# Patient Record
Sex: MALE | Race: WHITE | Employment: FULL TIME | URBAN - NONMETROPOLITAN AREA
[De-identification: names, ages, dates, MRNs, and addresses within clinical notes are randomized per-mention and may not be internally consistent; named-entity substitution may affect disease eponyms.]

---

## 2017-01-18 ENCOUNTER — NURSE ONLY (OUTPATIENT)
Dept: FAMILY MEDICINE CLINIC | Age: 67
End: 2017-01-18

## 2017-01-18 DIAGNOSIS — I26.99 OTHER PULMONARY EMBOLISM WITHOUT ACUTE COR PULMONALE, UNSPECIFIED CHRONICITY (HCC): ICD-10-CM

## 2017-01-18 LAB
INTERNATIONAL NORMALIZATION RATIO, POC: 2.2
PROTHROMBIN TIME, POC: NORMAL

## 2017-01-18 PROCEDURE — 85610 PROTHROMBIN TIME: CPT | Performed by: NURSE PRACTITIONER

## 2017-02-15 ENCOUNTER — NURSE ONLY (OUTPATIENT)
Dept: FAMILY MEDICINE CLINIC | Age: 67
End: 2017-02-15

## 2017-02-15 DIAGNOSIS — I26.99 OTHER PULMONARY EMBOLISM WITHOUT ACUTE COR PULMONALE, UNSPECIFIED CHRONICITY (HCC): ICD-10-CM

## 2017-02-15 LAB
INTERNATIONAL NORMALIZATION RATIO, POC: 2.7
PROTHROMBIN TIME, POC: NORMAL

## 2017-02-15 PROCEDURE — 85610 PROTHROMBIN TIME: CPT | Performed by: NURSE PRACTITIONER

## 2017-02-23 RX ORDER — PANTOPRAZOLE SODIUM 40 MG/1
40 TABLET, DELAYED RELEASE ORAL DAILY
Qty: 30 TABLET | Refills: 5 | Status: SHIPPED | OUTPATIENT
Start: 2017-02-23 | End: 2017-07-06 | Stop reason: SDUPTHER

## 2017-03-15 ENCOUNTER — NURSE ONLY (OUTPATIENT)
Dept: FAMILY MEDICINE CLINIC | Age: 67
End: 2017-03-15

## 2017-03-15 DIAGNOSIS — I26.99 OTHER PULMONARY EMBOLISM WITHOUT ACUTE COR PULMONALE, UNSPECIFIED CHRONICITY (HCC): ICD-10-CM

## 2017-03-15 LAB
INTERNATIONAL NORMALIZATION RATIO, POC: 2.8
PROTHROMBIN TIME, POC: NORMAL

## 2017-03-15 PROCEDURE — 85610 PROTHROMBIN TIME: CPT | Performed by: NURSE PRACTITIONER

## 2017-03-29 ENCOUNTER — OFFICE VISIT (OUTPATIENT)
Dept: FAMILY MEDICINE CLINIC | Age: 67
End: 2017-03-29

## 2017-03-29 VITALS
DIASTOLIC BLOOD PRESSURE: 80 MMHG | HEART RATE: 68 BPM | WEIGHT: 166 LBS | SYSTOLIC BLOOD PRESSURE: 132 MMHG | BODY MASS INDEX: 24.51 KG/M2 | RESPIRATION RATE: 15 BRPM

## 2017-03-29 DIAGNOSIS — E78.2 MIXED HYPERLIPIDEMIA: ICD-10-CM

## 2017-03-29 DIAGNOSIS — Z72.0 TOBACCO ABUSE: ICD-10-CM

## 2017-03-29 DIAGNOSIS — I27.82 OTHER CHRONIC PULMONARY EMBOLISM WITHOUT ACUTE COR PULMONALE (HCC): ICD-10-CM

## 2017-03-29 DIAGNOSIS — Z91.81 AT HIGH RISK FOR FALLS: Primary | ICD-10-CM

## 2017-03-29 DIAGNOSIS — K21.9 GASTROESOPHAGEAL REFLUX DISEASE WITHOUT ESOPHAGITIS: ICD-10-CM

## 2017-03-29 DIAGNOSIS — I10 ESSENTIAL HYPERTENSION: ICD-10-CM

## 2017-03-29 PROCEDURE — 99214 OFFICE O/P EST MOD 30 MIN: CPT | Performed by: NURSE PRACTITIONER

## 2017-03-29 ASSESSMENT — ENCOUNTER SYMPTOMS
VOMITING: 0
CONSTIPATION: 0
NAUSEA: 0
CHEST TIGHTNESS: 0
COUGH: 0
DIARRHEA: 0
SHORTNESS OF BREATH: 0
WHEEZING: 0
ABDOMINAL PAIN: 0

## 2017-03-29 ASSESSMENT — PATIENT HEALTH QUESTIONNAIRE - PHQ9
2. FEELING DOWN, DEPRESSED OR HOPELESS: 0
SUM OF ALL RESPONSES TO PHQ QUESTIONS 1-9: 0
1. LITTLE INTEREST OR PLEASURE IN DOING THINGS: 0
SUM OF ALL RESPONSES TO PHQ9 QUESTIONS 1 & 2: 0

## 2017-04-17 ENCOUNTER — NURSE ONLY (OUTPATIENT)
Dept: FAMILY MEDICINE CLINIC | Age: 67
End: 2017-04-17

## 2017-04-17 DIAGNOSIS — I27.82 OTHER CHRONIC PULMONARY EMBOLISM WITHOUT ACUTE COR PULMONALE (HCC): Primary | ICD-10-CM

## 2017-04-17 LAB
INTERNATIONAL NORMALIZATION RATIO, POC: 2.1
PROTHROMBIN TIME, POC: NORMAL

## 2017-04-17 PROCEDURE — 85610 PROTHROMBIN TIME: CPT | Performed by: NURSE PRACTITIONER

## 2017-04-19 RX ORDER — WARFARIN SODIUM 5 MG/1
TABLET ORAL
Qty: 30 TABLET | Refills: 5 | Status: SHIPPED | OUTPATIENT
Start: 2017-04-19 | End: 2017-06-02 | Stop reason: ALTCHOICE

## 2017-05-08 ENCOUNTER — HOSPITAL ENCOUNTER (OUTPATIENT)
Dept: CT IMAGING | Age: 67
Discharge: HOME | End: 2017-05-08
Attending: INTERNAL MEDICINE | Admitting: INTERNAL MEDICINE

## 2017-05-08 DIAGNOSIS — R91.1 SOLITARY PULMONARY NODULE: ICD-10-CM

## 2017-05-08 DIAGNOSIS — R91.1 LUNG NODULE: ICD-10-CM

## 2017-05-08 DIAGNOSIS — I26.99 OTHER PULMONARY EMBOLISM WITHOUT ACUTE COR PULMONALE, UNSPECIFIED CHRONICITY (HCC): ICD-10-CM

## 2017-05-08 LAB
CREAT SERPL-MCNC: 0.8 MG/DL (ref 0.9–1.3)
GFR AFRICAN AMERICAN: >60 ML/MIN/1.73M2
GFR NON-AFRICAN AMERICAN: >60 ML/MIN/1.73M2

## 2017-06-02 ENCOUNTER — OFFICE VISIT (OUTPATIENT)
Dept: FAMILY MEDICINE CLINIC | Age: 67
End: 2017-06-02

## 2017-06-02 ENCOUNTER — HOSPITAL ENCOUNTER (OUTPATIENT)
Dept: GENERAL RADIOLOGY | Age: 67
Discharge: HOME | End: 2017-06-02
Attending: FAMILY MEDICINE | Admitting: FAMILY MEDICINE

## 2017-06-02 VITALS
HEART RATE: 74 BPM | SYSTOLIC BLOOD PRESSURE: 122 MMHG | OXYGEN SATURATION: 96 % | DIASTOLIC BLOOD PRESSURE: 82 MMHG | RESPIRATION RATE: 18 BRPM | WEIGHT: 160 LBS | HEIGHT: 70 IN | BODY MASS INDEX: 22.9 KG/M2

## 2017-06-02 DIAGNOSIS — Z86.711 HISTORY OF PULMONARY EMBOLISM: Primary | ICD-10-CM

## 2017-06-02 DIAGNOSIS — Z86.711 HISTORY OF PULMONARY EMBOLISM: ICD-10-CM

## 2017-06-02 DIAGNOSIS — R05.9 COUGH: ICD-10-CM

## 2017-06-02 DIAGNOSIS — J18.9 PNEUMONIA OF LEFT LOWER LOBE DUE TO INFECTIOUS ORGANISM: ICD-10-CM

## 2017-06-02 DIAGNOSIS — G47.00 INSOMNIA, UNSPECIFIED TYPE: ICD-10-CM

## 2017-06-02 LAB
A/G RATIO: 1.4 (ref 1.1–2.2)
ALBUMIN SERPL-MCNC: 4.2 G/DL (ref 3.4–5)
ALP BLD-CCNC: 89 U/L (ref 40–129)
ALT SERPL-CCNC: 25 U/L (ref 10–40)
ANION GAP SERPL CALCULATED.3IONS-SCNC: 17 MMOL/L (ref 3–16)
AST SERPL-CCNC: 23 U/L (ref 15–37)
BASOPHILS ABSOLUTE: 0.2 K/UL (ref 0–0.2)
BASOPHILS RELATIVE PERCENT: 1.3 %
BILIRUB SERPL-MCNC: 1 MG/DL (ref 0–1)
BUN BLDV-MCNC: 10 MG/DL (ref 7–20)
C-REACTIVE PROTEIN: 147.9 MG/L (ref 0–5.1)
CALCIUM SERPL-MCNC: 9.1 MG/DL (ref 8.3–10.6)
CHLORIDE BLD-SCNC: 101 MMOL/L (ref 99–110)
CO2: 25 MMOL/L (ref 21–32)
CREAT SERPL-MCNC: 0.7 MG/DL (ref 0.8–1.3)
D DIMER: 420 NG/ML DDU (ref 0–229)
EOSINOPHILS ABSOLUTE: 0.1 K/UL (ref 0–0.6)
EOSINOPHILS RELATIVE PERCENT: 0.6 %
GFR AFRICAN AMERICAN: >60
GFR NON-AFRICAN AMERICAN: >60
GLOBULIN: 3.1 G/DL
GLUCOSE BLD-MCNC: 104 MG/DL (ref 70–99)
HCT VFR BLD CALC: 43.1 % (ref 40.5–52.5)
HEMOGLOBIN: 14.1 G/DL (ref 13.5–17.5)
LYMPHOCYTES ABSOLUTE: 1.2 K/UL (ref 1–5.1)
LYMPHOCYTES RELATIVE PERCENT: 8.4 %
MCH RBC QN AUTO: 30.6 PG (ref 26–34)
MCHC RBC AUTO-ENTMCNC: 32.6 G/DL (ref 31–36)
MCV RBC AUTO: 93.7 FL (ref 80–100)
MONOCYTES ABSOLUTE: 1.3 K/UL (ref 0–1.3)
MONOCYTES RELATIVE PERCENT: 9 %
NEUTROPHILS ABSOLUTE: 11.2 K/UL (ref 1.7–7.7)
NEUTROPHILS RELATIVE PERCENT: 80.7 %
PDW BLD-RTO: 14.6 % (ref 12.4–15.4)
PLATELET # BLD: 211 K/UL (ref 135–450)
PMV BLD AUTO: 9.8 FL (ref 5–10.5)
POTASSIUM SERPL-SCNC: 4.3 MMOL/L (ref 3.5–5.1)
RBC # BLD: 4.6 M/UL (ref 4.2–5.9)
SODIUM BLD-SCNC: 143 MMOL/L (ref 136–145)
TOTAL PROTEIN: 7.3 G/DL (ref 6.4–8.2)
WBC # BLD: 13.9 K/UL (ref 4–11)

## 2017-06-02 PROCEDURE — 99214 OFFICE O/P EST MOD 30 MIN: CPT | Performed by: FAMILY MEDICINE

## 2017-06-02 PROCEDURE — 96372 THER/PROPH/DIAG INJ SC/IM: CPT | Performed by: FAMILY MEDICINE

## 2017-06-02 RX ORDER — BETAMETHASONE SODIUM PHOSPHATE AND BETAMETHASONE ACETATE 3; 3 MG/ML; MG/ML
12 INJECTION, SUSPENSION INTRA-ARTICULAR; INTRALESIONAL; INTRAMUSCULAR; SOFT TISSUE ONCE
Status: COMPLETED | OUTPATIENT
Start: 2017-06-02 | End: 2017-06-02

## 2017-06-02 RX ORDER — DOXYCYCLINE HYCLATE 100 MG
100 TABLET ORAL 2 TIMES DAILY
Qty: 20 TABLET | Refills: 0 | Status: SHIPPED | OUTPATIENT
Start: 2017-06-02 | End: 2017-06-12

## 2017-06-02 RX ADMIN — BETAMETHASONE SODIUM PHOSPHATE AND BETAMETHASONE ACETATE 12 MG: 3; 3 INJECTION, SUSPENSION INTRA-ARTICULAR; INTRALESIONAL; INTRAMUSCULAR; SOFT TISSUE at 14:35

## 2017-06-02 ASSESSMENT — ENCOUNTER SYMPTOMS
NAUSEA: 0
COUGH: 1
VOMITING: 0
SORE THROAT: 0
RHINORRHEA: 0
SHORTNESS OF BREATH: 0
DIARRHEA: 0
WHEEZING: 0
CHEST TIGHTNESS: 0
BACK PAIN: 0
BLOOD IN STOOL: 0
CONSTIPATION: 0
ABDOMINAL PAIN: 0
SINUS PRESSURE: 1

## 2017-06-02 ASSESSMENT — PATIENT HEALTH QUESTIONNAIRE - PHQ9
SUM OF ALL RESPONSES TO PHQ QUESTIONS 1-9: 0
2. FEELING DOWN, DEPRESSED OR HOPELESS: 0
1. LITTLE INTEREST OR PLEASURE IN DOING THINGS: 0
SUM OF ALL RESPONSES TO PHQ9 QUESTIONS 1 & 2: 0

## 2017-06-08 ENCOUNTER — OFFICE VISIT (OUTPATIENT)
Dept: FAMILY MEDICINE CLINIC | Age: 67
End: 2017-06-08

## 2017-06-08 VITALS
BODY MASS INDEX: 23.53 KG/M2 | HEART RATE: 83 BPM | DIASTOLIC BLOOD PRESSURE: 74 MMHG | OXYGEN SATURATION: 98 % | WEIGHT: 164 LBS | SYSTOLIC BLOOD PRESSURE: 128 MMHG | RESPIRATION RATE: 15 BRPM

## 2017-06-08 DIAGNOSIS — R05.9 COUGH: ICD-10-CM

## 2017-06-08 DIAGNOSIS — I10 ESSENTIAL HYPERTENSION: Primary | ICD-10-CM

## 2017-06-08 DIAGNOSIS — Z86.711 HISTORY OF PULMONARY EMBOLISM: ICD-10-CM

## 2017-06-08 PROCEDURE — 99213 OFFICE O/P EST LOW 20 MIN: CPT | Performed by: NURSE PRACTITIONER

## 2017-06-08 RX ORDER — LISINOPRIL 20 MG/1
20 TABLET ORAL 2 TIMES DAILY
Qty: 60 TABLET | Refills: 11 | Status: SHIPPED | OUTPATIENT
Start: 2017-06-08 | End: 2018-04-05 | Stop reason: SDUPTHER

## 2017-06-08 ASSESSMENT — ENCOUNTER SYMPTOMS
CONSTIPATION: 0
CHEST TIGHTNESS: 0
COUGH: 0
SHORTNESS OF BREATH: 0
VOMITING: 0
NAUSEA: 0
DIARRHEA: 0
WHEEZING: 0
ABDOMINAL PAIN: 0

## 2017-07-06 ENCOUNTER — OFFICE VISIT (OUTPATIENT)
Dept: FAMILY MEDICINE CLINIC | Age: 67
End: 2017-07-06

## 2017-07-06 VITALS
DIASTOLIC BLOOD PRESSURE: 72 MMHG | WEIGHT: 157 LBS | BODY MASS INDEX: 22.48 KG/M2 | RESPIRATION RATE: 18 BRPM | HEART RATE: 90 BPM | HEIGHT: 70 IN | SYSTOLIC BLOOD PRESSURE: 132 MMHG | OXYGEN SATURATION: 93 %

## 2017-07-06 DIAGNOSIS — E78.2 MIXED HYPERLIPIDEMIA: ICD-10-CM

## 2017-07-06 DIAGNOSIS — J18.9 PNEUMONIA DUE TO INFECTIOUS ORGANISM, UNSPECIFIED LATERALITY, UNSPECIFIED PART OF LUNG: Primary | ICD-10-CM

## 2017-07-06 DIAGNOSIS — Z12.11 COLON CANCER SCREENING: ICD-10-CM

## 2017-07-06 DIAGNOSIS — I10 ESSENTIAL HYPERTENSION: ICD-10-CM

## 2017-07-06 PROCEDURE — 99213 OFFICE O/P EST LOW 20 MIN: CPT | Performed by: NURSE PRACTITIONER

## 2017-07-06 RX ORDER — AMLODIPINE BESYLATE 5 MG/1
5 TABLET ORAL DAILY
Qty: 30 TABLET | Refills: 11 | Status: SHIPPED | OUTPATIENT
Start: 2017-07-06 | End: 2018-04-05 | Stop reason: SDUPTHER

## 2017-07-06 RX ORDER — ATORVASTATIN CALCIUM 40 MG/1
40 TABLET, FILM COATED ORAL DAILY
Qty: 30 TABLET | Refills: 11 | Status: SHIPPED | OUTPATIENT
Start: 2017-07-06 | End: 2018-04-05 | Stop reason: SDUPTHER

## 2017-07-06 RX ORDER — METOPROLOL SUCCINATE 50 MG/1
50 TABLET, EXTENDED RELEASE ORAL DAILY
Qty: 30 TABLET | Refills: 11 | Status: SHIPPED | OUTPATIENT
Start: 2017-07-06 | End: 2018-04-05 | Stop reason: SDUPTHER

## 2017-07-06 RX ORDER — PANTOPRAZOLE SODIUM 40 MG/1
40 TABLET, DELAYED RELEASE ORAL DAILY
Qty: 30 TABLET | Refills: 11 | Status: SHIPPED | OUTPATIENT
Start: 2017-07-06 | End: 2018-04-05 | Stop reason: SDUPTHER

## 2017-07-06 ASSESSMENT — ENCOUNTER SYMPTOMS
VOMITING: 0
DIARRHEA: 0
CHEST TIGHTNESS: 0
WHEEZING: 0
SHORTNESS OF BREATH: 0
CONSTIPATION: 0
ABDOMINAL PAIN: 0
NAUSEA: 0
COUGH: 0

## 2017-07-10 ENCOUNTER — HOSPITAL ENCOUNTER (OUTPATIENT)
Dept: GENERAL RADIOLOGY | Age: 67
Discharge: HOME | End: 2017-07-10
Attending: NURSE PRACTITIONER | Admitting: NURSE PRACTITIONER

## 2017-07-10 DIAGNOSIS — J18.9 UNRESOLVED PNEUMONIA: ICD-10-CM

## 2017-07-11 DIAGNOSIS — J18.9 UNRESOLVED PNEUMONIA: Primary | ICD-10-CM

## 2017-08-09 ENCOUNTER — HOSPITAL ENCOUNTER (OUTPATIENT)
Dept: GENERAL RADIOLOGY | Age: 67
Discharge: HOME | End: 2017-08-09
Attending: INTERNAL MEDICINE | Admitting: NURSE PRACTITIONER

## 2017-08-09 DIAGNOSIS — J18.9 UNRESOLVED PNEUMONIA: ICD-10-CM

## 2017-09-05 ENCOUNTER — PAT TELEPHONE (OUTPATIENT)
Dept: SURGERY | Age: 67
End: 2017-09-05

## 2017-09-05 VITALS — WEIGHT: 160 LBS | HEIGHT: 69 IN | BODY MASS INDEX: 23.7 KG/M2

## 2017-09-06 ENCOUNTER — HOSPITAL ENCOUNTER (OUTPATIENT)
Dept: SURGERY | Age: 67
Discharge: HOME | End: 2017-09-06
Attending: INTERNAL MEDICINE | Admitting: INTERNAL MEDICINE

## 2017-09-06 VITALS
RESPIRATION RATE: 16 BRPM | OXYGEN SATURATION: 95 % | TEMPERATURE: 97.2 F | SYSTOLIC BLOOD PRESSURE: 117 MMHG | BODY MASS INDEX: 23.25 KG/M2 | HEIGHT: 69 IN | DIASTOLIC BLOOD PRESSURE: 82 MMHG | WEIGHT: 157 LBS | HEART RATE: 82 BPM

## 2017-09-06 RX ORDER — SODIUM CHLORIDE, SODIUM LACTATE, POTASSIUM CHLORIDE, CALCIUM CHLORIDE 600; 310; 30; 20 MG/100ML; MG/100ML; MG/100ML; MG/100ML
INJECTION, SOLUTION INTRAVENOUS CONTINUOUS
Status: DISCONTINUED | OUTPATIENT
Start: 2017-09-06 | End: 2017-09-07 | Stop reason: HOSPADM

## 2017-09-06 RX ADMIN — SODIUM CHLORIDE, SODIUM LACTATE, POTASSIUM CHLORIDE, CALCIUM CHLORIDE: 600; 310; 30; 20 INJECTION, SOLUTION INTRAVENOUS at 08:13

## 2017-09-06 ASSESSMENT — PAIN SCALES - GENERAL
PAINLEVEL_OUTOF10: 0
PAINLEVEL_OUTOF10: 0

## 2017-09-06 ASSESSMENT — PAIN - FUNCTIONAL ASSESSMENT: PAIN_FUNCTIONAL_ASSESSMENT: 0-10

## 2017-10-05 ENCOUNTER — OFFICE VISIT (OUTPATIENT)
Dept: FAMILY MEDICINE CLINIC | Age: 67
End: 2017-10-05

## 2017-10-05 VITALS
RESPIRATION RATE: 14 BRPM | SYSTOLIC BLOOD PRESSURE: 138 MMHG | DIASTOLIC BLOOD PRESSURE: 84 MMHG | BODY MASS INDEX: 24.1 KG/M2 | WEIGHT: 163.2 LBS | OXYGEN SATURATION: 90 % | HEART RATE: 74 BPM

## 2017-10-05 DIAGNOSIS — Z72.0 TOBACCO ABUSE: ICD-10-CM

## 2017-10-05 DIAGNOSIS — E78.2 MIXED HYPERLIPIDEMIA: ICD-10-CM

## 2017-10-05 DIAGNOSIS — Z13.6 SCREENING FOR AAA (ABDOMINAL AORTIC ANEURYSM): ICD-10-CM

## 2017-10-05 DIAGNOSIS — I10 ESSENTIAL HYPERTENSION: Primary | ICD-10-CM

## 2017-10-05 DIAGNOSIS — K21.9 GASTROESOPHAGEAL REFLUX DISEASE WITHOUT ESOPHAGITIS: ICD-10-CM

## 2017-10-05 PROCEDURE — 99214 OFFICE O/P EST MOD 30 MIN: CPT | Performed by: NURSE PRACTITIONER

## 2017-10-05 ASSESSMENT — ENCOUNTER SYMPTOMS
WHEEZING: 0
CONSTIPATION: 0
SHORTNESS OF BREATH: 0
VOMITING: 0
ABDOMINAL PAIN: 0
CHEST TIGHTNESS: 0
NAUSEA: 0
DIARRHEA: 0
COUGH: 0

## 2017-10-05 NOTE — PROGRESS NOTES
SUBJECTIVE:    Amena Loud  1950  74 y.o.  male      Chief Complaint   Patient presents with    6 Month Follow-Up     HPI    Problem List Items Addressed This Visit     Tobacco abuse     Number of cigarettes daily varies         Essential hypertension - Primary     Stable  Denies chest pain, SOB, peripheral edema         Gastroesophageal reflux disease without esophagitis     Stable with protonix         Mixed hyperlipidemia     The 10-year ASCVD risk score (Helen Gordon et al., 2013) is: 18.4%    Values used to calculate the score:      Age: 74 years      Sex: Male      Is Non- : No      Diabetic: No      Tobacco smoker: Yes      Systolic Blood Pressure: 018 mmHg      Is BP treated: Yes      HDL Cholesterol: 71 mg/dL      Total Cholesterol: 143 mg/dL     Currently on lipitor             Other Visit Diagnoses     Screening for AAA (abdominal aortic aneurysm)        Relevant Orders    US screening for AAA          Review of Systems   Constitutional: Negative for appetite change, chills, fatigue and unexpected weight change. Respiratory: Negative for cough, chest tightness, shortness of breath and wheezing. Cardiovascular: Negative for chest pain, palpitations and leg swelling. Gastrointestinal: Negative for abdominal pain, constipation, diarrhea, nausea and vomiting. Musculoskeletal: Negative for arthralgias. Neurological: Negative for weakness, numbness and headaches. Hematological: Negative for adenopathy. OBJECTIVE:    /84  Pulse 74  Resp 14  Wt 163 lb 3.2 oz (74 kg)  SpO2 90%  BMI 24.1 kg/m2    Physical Exam   Constitutional: He is oriented to person, place, and time. He appears well-developed and well-nourished. HENT:   Head: Normocephalic and atraumatic. Neck: Normal range of motion. Neck supple. No JVD present. Carotid bruit is not present. Cardiovascular: Normal rate, regular rhythm and normal heart sounds.   Exam reveals no gallop and no

## 2017-10-05 NOTE — ASSESSMENT & PLAN NOTE
The 10-year ASCVD risk score Chandra Pham al., 2013) is: 18.4%    Values used to calculate the score:      Age: 74 years      Sex: Male      Is Non- : No      Diabetic: No      Tobacco smoker: Yes      Systolic Blood Pressure: 836 mmHg      Is BP treated: Yes      HDL Cholesterol: 71 mg/dL      Total Cholesterol: 143 mg/dL     Currently on lipitor

## 2017-10-05 NOTE — MR AVS SNAPSHOT
If you have questions, please contact the physician practice where you receive care. Remember, MyChart is NOT to be used for urgent needs. For medical emergencies, dial 911. For questions regarding your [employer] account call [phone]. If you have a clinical question, please call your doctor's office.

## 2017-10-31 ENCOUNTER — HOSPITAL ENCOUNTER (OUTPATIENT)
Dept: ULTRASOUND IMAGING | Age: 67
Discharge: HOME | End: 2017-10-31
Attending: NURSE PRACTITIONER | Admitting: NURSE PRACTITIONER

## 2017-10-31 DIAGNOSIS — Z13.6 SCREENING FOR AAA (ABDOMINAL AORTIC ANEURYSM): ICD-10-CM

## 2018-04-05 ENCOUNTER — OFFICE VISIT (OUTPATIENT)
Dept: FAMILY MEDICINE CLINIC | Age: 68
End: 2018-04-05

## 2018-04-05 VITALS
WEIGHT: 170 LBS | SYSTOLIC BLOOD PRESSURE: 142 MMHG | DIASTOLIC BLOOD PRESSURE: 80 MMHG | RESPIRATION RATE: 16 BRPM | HEIGHT: 70 IN | BODY MASS INDEX: 24.34 KG/M2 | HEART RATE: 81 BPM

## 2018-04-05 DIAGNOSIS — Z00.00 ROUTINE HEALTH MAINTENANCE: ICD-10-CM

## 2018-04-05 DIAGNOSIS — I10 ESSENTIAL HYPERTENSION: Primary | ICD-10-CM

## 2018-04-05 DIAGNOSIS — K21.9 GASTROESOPHAGEAL REFLUX DISEASE WITHOUT ESOPHAGITIS: ICD-10-CM

## 2018-04-05 DIAGNOSIS — E78.2 MIXED HYPERLIPIDEMIA: ICD-10-CM

## 2018-04-05 DIAGNOSIS — Z72.0 TOBACCO ABUSE: ICD-10-CM

## 2018-04-05 DIAGNOSIS — I27.82 OTHER CHRONIC PULMONARY EMBOLISM WITHOUT ACUTE COR PULMONALE (HCC): ICD-10-CM

## 2018-04-05 LAB
A/G RATIO: 1.6 (ref 1.1–2.2)
ALBUMIN SERPL-MCNC: 4.4 G/DL (ref 3.4–5)
ALP BLD-CCNC: 70 U/L (ref 40–129)
ALT SERPL-CCNC: 21 U/L (ref 10–40)
ANION GAP SERPL CALCULATED.3IONS-SCNC: 16 MMOL/L (ref 3–16)
AST SERPL-CCNC: 23 U/L (ref 15–37)
BILIRUB SERPL-MCNC: 0.4 MG/DL (ref 0–1)
BUN BLDV-MCNC: 16 MG/DL (ref 7–20)
CALCIUM SERPL-MCNC: 9 MG/DL (ref 8.3–10.6)
CHLORIDE BLD-SCNC: 103 MMOL/L (ref 99–110)
CO2: 24 MMOL/L (ref 21–32)
CREAT SERPL-MCNC: 0.7 MG/DL (ref 0.8–1.3)
GFR AFRICAN AMERICAN: >60
GFR NON-AFRICAN AMERICAN: >60
GLOBULIN: 2.7 G/DL
GLUCOSE BLD-MCNC: 93 MG/DL (ref 70–99)
HCT VFR BLD CALC: 40.7 % (ref 40.5–52.5)
HEMOGLOBIN: 14.1 G/DL (ref 13.5–17.5)
MCH RBC QN AUTO: 31.9 PG (ref 26–34)
MCHC RBC AUTO-ENTMCNC: 34.6 G/DL (ref 31–36)
MCV RBC AUTO: 92.3 FL (ref 80–100)
PDW BLD-RTO: 13.8 % (ref 12.4–15.4)
PLATELET # BLD: 226 K/UL (ref 135–450)
PMV BLD AUTO: 9.5 FL (ref 5–10.5)
POTASSIUM SERPL-SCNC: 4.4 MMOL/L (ref 3.5–5.1)
RBC # BLD: 4.42 M/UL (ref 4.2–5.9)
SODIUM BLD-SCNC: 143 MMOL/L (ref 136–145)
TOTAL PROTEIN: 7.1 G/DL (ref 6.4–8.2)
WBC # BLD: 7.8 K/UL (ref 4–11)

## 2018-04-05 PROCEDURE — 99214 OFFICE O/P EST MOD 30 MIN: CPT | Performed by: NURSE PRACTITIONER

## 2018-04-05 RX ORDER — AMLODIPINE BESYLATE 10 MG/1
10 TABLET ORAL DAILY
Qty: 30 TABLET | Refills: 1 | Status: SHIPPED | OUTPATIENT
Start: 2018-04-05 | End: 2018-06-02 | Stop reason: SDUPTHER

## 2018-04-05 RX ORDER — PANTOPRAZOLE SODIUM 40 MG/1
40 TABLET, DELAYED RELEASE ORAL DAILY
Qty: 30 TABLET | Refills: 11 | Status: SHIPPED | OUTPATIENT
Start: 2018-04-05 | End: 2019-04-13 | Stop reason: SDUPTHER

## 2018-04-05 RX ORDER — LISINOPRIL 20 MG/1
20 TABLET ORAL 2 TIMES DAILY
Qty: 60 TABLET | Refills: 11 | Status: SHIPPED | OUTPATIENT
Start: 2018-04-05 | End: 2019-04-12 | Stop reason: SDUPTHER

## 2018-04-05 RX ORDER — ATORVASTATIN CALCIUM 80 MG/1
80 TABLET, FILM COATED ORAL DAILY
Qty: 30 TABLET | Refills: 11 | Status: SHIPPED | OUTPATIENT
Start: 2018-04-05 | End: 2019-02-04 | Stop reason: CLARIF

## 2018-04-05 RX ORDER — METOPROLOL SUCCINATE 50 MG/1
50 TABLET, EXTENDED RELEASE ORAL DAILY
Qty: 30 TABLET | Refills: 11 | Status: SHIPPED | OUTPATIENT
Start: 2018-04-05 | End: 2019-04-30 | Stop reason: SDUPTHER

## 2018-04-05 RX ORDER — ATORVASTATIN CALCIUM 40 MG/1
40 TABLET, FILM COATED ORAL DAILY
Qty: 30 TABLET | Refills: 11 | Status: SHIPPED | OUTPATIENT
Start: 2018-04-05 | End: 2018-04-05 | Stop reason: SDUPTHER

## 2018-04-05 ASSESSMENT — ENCOUNTER SYMPTOMS
COUGH: 0
VOMITING: 0
ABDOMINAL PAIN: 0
WHEEZING: 0
CHEST TIGHTNESS: 0
NAUSEA: 0
SHORTNESS OF BREATH: 0
DIARRHEA: 0
CONSTIPATION: 0

## 2018-05-02 ENCOUNTER — OFFICE VISIT (OUTPATIENT)
Dept: FAMILY MEDICINE CLINIC | Age: 68
End: 2018-05-02

## 2018-05-02 VITALS
RESPIRATION RATE: 18 BRPM | WEIGHT: 164.6 LBS | BODY MASS INDEX: 23.96 KG/M2 | SYSTOLIC BLOOD PRESSURE: 118 MMHG | HEART RATE: 71 BPM | DIASTOLIC BLOOD PRESSURE: 78 MMHG

## 2018-05-02 DIAGNOSIS — K21.9 GASTROESOPHAGEAL REFLUX DISEASE WITHOUT ESOPHAGITIS: ICD-10-CM

## 2018-05-02 DIAGNOSIS — I10 ESSENTIAL HYPERTENSION: Primary | ICD-10-CM

## 2018-05-02 DIAGNOSIS — Z72.0 TOBACCO ABUSE: ICD-10-CM

## 2018-05-02 DIAGNOSIS — E78.2 MIXED HYPERLIPIDEMIA: ICD-10-CM

## 2018-05-02 PROCEDURE — 99214 OFFICE O/P EST MOD 30 MIN: CPT | Performed by: NURSE PRACTITIONER

## 2018-05-02 RX ORDER — ATORVASTATIN CALCIUM 80 MG/1
80 TABLET, FILM COATED ORAL DAILY
Qty: 30 TABLET | Refills: 11 | Status: CANCELLED | OUTPATIENT
Start: 2018-05-02

## 2018-05-02 RX ORDER — AMOXICILLIN 500 MG/1
1 CAPSULE ORAL 3 TIMES DAILY
Refills: 0 | COMMUNITY
Start: 2018-04-27 | End: 2018-11-01 | Stop reason: CLARIF

## 2018-05-02 ASSESSMENT — ENCOUNTER SYMPTOMS
SHORTNESS OF BREATH: 0
VOMITING: 0
ABDOMINAL PAIN: 0
NAUSEA: 0
CHEST TIGHTNESS: 0
DIARRHEA: 0
WHEEZING: 0
COUGH: 0
CONSTIPATION: 0

## 2018-09-26 PROBLEM — R05.9 COUGH: Status: RESOLVED | Noted: 2017-06-02 | Resolved: 2018-09-26

## 2018-11-01 ENCOUNTER — OFFICE VISIT (OUTPATIENT)
Dept: FAMILY MEDICINE CLINIC | Age: 68
End: 2018-11-01
Payer: COMMERCIAL

## 2018-11-01 VITALS
HEART RATE: 103 BPM | OXYGEN SATURATION: 98 % | SYSTOLIC BLOOD PRESSURE: 120 MMHG | WEIGHT: 160.8 LBS | DIASTOLIC BLOOD PRESSURE: 78 MMHG | BODY MASS INDEX: 23.41 KG/M2 | RESPIRATION RATE: 16 BRPM

## 2018-11-01 DIAGNOSIS — Z00.00 ROUTINE HEALTH MAINTENANCE: ICD-10-CM

## 2018-11-01 DIAGNOSIS — I27.82 OTHER CHRONIC PULMONARY EMBOLISM WITHOUT ACUTE COR PULMONALE (HCC): ICD-10-CM

## 2018-11-01 DIAGNOSIS — R07.89 BURNING IN THE CHEST: Primary | ICD-10-CM

## 2018-11-01 DIAGNOSIS — K21.9 GASTROESOPHAGEAL REFLUX DISEASE WITHOUT ESOPHAGITIS: ICD-10-CM

## 2018-11-01 DIAGNOSIS — E78.2 MIXED HYPERLIPIDEMIA: ICD-10-CM

## 2018-11-01 DIAGNOSIS — I10 ESSENTIAL HYPERTENSION: ICD-10-CM

## 2018-11-01 DIAGNOSIS — Z72.0 TOBACCO ABUSE: ICD-10-CM

## 2018-11-01 DIAGNOSIS — R53.83 FATIGUE, UNSPECIFIED TYPE: ICD-10-CM

## 2018-11-01 LAB
HCT VFR BLD CALC: 45.8 % (ref 40.5–52.5)
HEMOGLOBIN: 15.4 G/DL (ref 13.5–17.5)
MCH RBC QN AUTO: 31.6 PG (ref 26–34)
MCHC RBC AUTO-ENTMCNC: 33.5 G/DL (ref 31–36)
MCV RBC AUTO: 94.3 FL (ref 80–100)
PDW BLD-RTO: 13.6 % (ref 12.4–15.4)
PLATELET # BLD: 289 K/UL (ref 135–450)
PMV BLD AUTO: 8.9 FL (ref 5–10.5)
RBC # BLD: 4.86 M/UL (ref 4.2–5.9)
WBC # BLD: 12.2 K/UL (ref 4–11)

## 2018-11-01 PROCEDURE — 99214 OFFICE O/P EST MOD 30 MIN: CPT | Performed by: NURSE PRACTITIONER

## 2018-11-01 PROCEDURE — 93000 ELECTROCARDIOGRAM COMPLETE: CPT | Performed by: NURSE PRACTITIONER

## 2018-11-01 ASSESSMENT — PATIENT HEALTH QUESTIONNAIRE - PHQ9
SUM OF ALL RESPONSES TO PHQ QUESTIONS 1-9: 0
SUM OF ALL RESPONSES TO PHQ QUESTIONS 1-9: 0
SUM OF ALL RESPONSES TO PHQ9 QUESTIONS 1 & 2: 0
1. LITTLE INTEREST OR PLEASURE IN DOING THINGS: 0
2. FEELING DOWN, DEPRESSED OR HOPELESS: 0

## 2018-11-01 ASSESSMENT — ENCOUNTER SYMPTOMS
VOMITING: 0
CONSTIPATION: 0
CHEST TIGHTNESS: 0
ABDOMINAL PAIN: 0
SHORTNESS OF BREATH: 0
NAUSEA: 0
DIARRHEA: 0
WHEEZING: 0
COUGH: 0

## 2018-11-01 NOTE — PROGRESS NOTES
health maintenance  - CBC; Future  - Comprehensive Metabolic Panel; Future  - Lipid Panel; Future    8. Fatigue, unspecified type  Most likely due to stress at work and lack of sleep at night due to stress. Follow up earlier if symptoms persist.                Return in about 3 months (around 2/1/2019).       (Please note that portions of this note may have been completed with a voice recognition program. Efforts were made to edit the dictations but occasionally words aremis-transcribed.)

## 2018-11-01 NOTE — ASSESSMENT & PLAN NOTE
Patient states that 2 days ago he drinks coffee in the afternoon which is abnormal for him. He doesn't her at usual time. He woke up in the middle night\" but I was on fire\". He felt burning from his stomach up to his throat. He did not miss his dose of Protonix. He tried Rolaids and Tums without relief. The burning resolved by the afternoon of the following day. He has not had any issues in the last 2 days. Note that leading up to Tuesday he would eat fast due to being busy at work and get a little burning. Denies chest pain, shortness of breath, decreased exercise tolerance.

## 2018-11-01 NOTE — ASSESSMENT & PLAN NOTE
The 10-year ASCVD risk score (No Cuenca, et al., 2013) is: 15.6%    Values used to calculate the score:      Age: 69 years      Sex: Male      Is Non- : No      Diabetic: No      Tobacco smoker: Yes      Systolic Blood Pressure: 454 mmHg      Is BP treated: Yes      HDL Cholesterol: 71 mg/dL      Total Cholesterol: 143 mg/dL

## 2018-11-01 NOTE — ASSESSMENT & PLAN NOTE
Not smoking at work. Smoking approximately 5 cigarettes a day.  Increased from two cigarettes in the last month

## 2018-11-01 NOTE — ASSESSMENT & PLAN NOTE
Stable. Patient denies any exertional chest pain, dyspnea, palpitations, syncope, orthopnea, edema or paroxysmal nocturnal dyspnea.

## 2018-11-02 LAB
A/G RATIO: 1.5 (ref 1.1–2.2)
ALBUMIN SERPL-MCNC: 4.6 G/DL (ref 3.4–5)
ALP BLD-CCNC: 93 U/L (ref 40–129)
ALT SERPL-CCNC: 27 U/L (ref 10–40)
ANION GAP SERPL CALCULATED.3IONS-SCNC: 17 MMOL/L (ref 3–16)
AST SERPL-CCNC: 26 U/L (ref 15–37)
BILIRUB SERPL-MCNC: 0.6 MG/DL (ref 0–1)
BUN BLDV-MCNC: 15 MG/DL (ref 7–20)
CALCIUM SERPL-MCNC: 9.4 MG/DL (ref 8.3–10.6)
CHLORIDE BLD-SCNC: 102 MMOL/L (ref 99–110)
CHOLESTEROL, TOTAL: 140 MG/DL (ref 0–199)
CO2: 23 MMOL/L (ref 21–32)
CREAT SERPL-MCNC: 0.9 MG/DL (ref 0.8–1.3)
GFR AFRICAN AMERICAN: >60
GFR NON-AFRICAN AMERICAN: >60
GLOBULIN: 3.1 G/DL
GLUCOSE BLD-MCNC: 96 MG/DL (ref 70–99)
HDLC SERPL-MCNC: 56 MG/DL (ref 40–60)
LDL CHOLESTEROL CALCULATED: 66 MG/DL
POTASSIUM SERPL-SCNC: 4.5 MMOL/L (ref 3.5–5.1)
SODIUM BLD-SCNC: 142 MMOL/L (ref 136–145)
TOTAL PROTEIN: 7.7 G/DL (ref 6.4–8.2)
TRIGL SERPL-MCNC: 90 MG/DL (ref 0–150)
VLDLC SERPL CALC-MCNC: 18 MG/DL

## 2018-11-09 DIAGNOSIS — D72.829 LEUKOCYTOSIS, UNSPECIFIED TYPE: Primary | ICD-10-CM

## 2019-02-04 ENCOUNTER — OFFICE VISIT (OUTPATIENT)
Dept: FAMILY MEDICINE CLINIC | Age: 69
End: 2019-02-04
Payer: COMMERCIAL

## 2019-02-04 VITALS
SYSTOLIC BLOOD PRESSURE: 108 MMHG | HEART RATE: 87 BPM | RESPIRATION RATE: 16 BRPM | OXYGEN SATURATION: 96 % | BODY MASS INDEX: 23.81 KG/M2 | DIASTOLIC BLOOD PRESSURE: 70 MMHG | WEIGHT: 163.6 LBS

## 2019-02-04 DIAGNOSIS — R53.83 FATIGUE, UNSPECIFIED TYPE: ICD-10-CM

## 2019-02-04 DIAGNOSIS — R09.89 RHONCHI: ICD-10-CM

## 2019-02-04 DIAGNOSIS — Z72.0 TOBACCO ABUSE: ICD-10-CM

## 2019-02-04 DIAGNOSIS — I10 ESSENTIAL HYPERTENSION: ICD-10-CM

## 2019-02-04 DIAGNOSIS — D72.829 LEUKOCYTOSIS, UNSPECIFIED TYPE: Primary | ICD-10-CM

## 2019-02-04 DIAGNOSIS — K21.9 GASTROESOPHAGEAL REFLUX DISEASE WITHOUT ESOPHAGITIS: ICD-10-CM

## 2019-02-04 DIAGNOSIS — I27.82 OTHER CHRONIC PULMONARY EMBOLISM WITHOUT ACUTE COR PULMONALE (HCC): ICD-10-CM

## 2019-02-04 DIAGNOSIS — E78.2 MIXED HYPERLIPIDEMIA: ICD-10-CM

## 2019-02-04 DIAGNOSIS — D72.829 LEUKOCYTOSIS, UNSPECIFIED TYPE: ICD-10-CM

## 2019-02-04 LAB
BASOPHILS ABSOLUTE: 0.1 K/UL (ref 0–0.2)
BASOPHILS RELATIVE PERCENT: 0.9 %
EOSINOPHILS ABSOLUTE: 0.2 K/UL (ref 0–0.6)
EOSINOPHILS RELATIVE PERCENT: 2.4 %
HCT VFR BLD CALC: 43.4 % (ref 40.5–52.5)
HEMOGLOBIN: 14.5 G/DL (ref 13.5–17.5)
LYMPHOCYTES ABSOLUTE: 1.8 K/UL (ref 1–5.1)
LYMPHOCYTES RELATIVE PERCENT: 22.7 %
MCH RBC QN AUTO: 31.3 PG (ref 26–34)
MCHC RBC AUTO-ENTMCNC: 33.3 G/DL (ref 31–36)
MCV RBC AUTO: 94 FL (ref 80–100)
MONOCYTES ABSOLUTE: 0.8 K/UL (ref 0–1.3)
MONOCYTES RELATIVE PERCENT: 9.8 %
NEUTROPHILS ABSOLUTE: 5 K/UL (ref 1.7–7.7)
NEUTROPHILS RELATIVE PERCENT: 64.2 %
PDW BLD-RTO: 14 % (ref 12.4–15.4)
PLATELET # BLD: 256 K/UL (ref 135–450)
PMV BLD AUTO: 9.6 FL (ref 5–10.5)
RBC # BLD: 4.61 M/UL (ref 4.2–5.9)
TSH REFLEX: 1.57 UIU/ML (ref 0.27–4.2)
WBC # BLD: 7.8 K/UL (ref 4–11)

## 2019-02-04 PROCEDURE — 99214 OFFICE O/P EST MOD 30 MIN: CPT | Performed by: NURSE PRACTITIONER

## 2019-02-04 RX ORDER — ATORVASTATIN CALCIUM 80 MG/1
80 TABLET, FILM COATED ORAL DAILY
Qty: 30 TABLET | Refills: 11 | Status: SHIPPED | OUTPATIENT
Start: 2019-02-04 | End: 2020-01-13

## 2019-02-04 RX ORDER — ATORVASTATIN CALCIUM 40 MG/1
40 TABLET, FILM COATED ORAL DAILY
Refills: 11 | COMMUNITY
Start: 2018-12-03 | End: 2019-02-04 | Stop reason: SDUPTHER

## 2019-02-04 ASSESSMENT — ENCOUNTER SYMPTOMS
COUGH: 0
SHORTNESS OF BREATH: 0
WHEEZING: 0
CHEST TIGHTNESS: 0
DIARRHEA: 0
VOMITING: 0
NAUSEA: 0
CONSTIPATION: 0
ABDOMINAL PAIN: 0

## 2019-02-07 ENCOUNTER — HOSPITAL ENCOUNTER (OUTPATIENT)
Dept: GENERAL RADIOLOGY | Age: 69
Discharge: HOME | End: 2019-02-07
Payer: COMMERCIAL

## 2019-02-07 ENCOUNTER — HOSPITAL ENCOUNTER (OUTPATIENT)
Age: 69
Discharge: HOME | End: 2019-02-07
Payer: COMMERCIAL

## 2019-02-07 DIAGNOSIS — R09.89 RHONCHI: ICD-10-CM

## 2019-02-07 PROCEDURE — 71046 X-RAY EXAM CHEST 2 VIEWS: CPT

## 2019-04-12 DIAGNOSIS — I10 ESSENTIAL HYPERTENSION: ICD-10-CM

## 2019-04-12 RX ORDER — LISINOPRIL 20 MG/1
20 TABLET ORAL 2 TIMES DAILY
Qty: 60 TABLET | Refills: 11 | Status: SHIPPED | OUTPATIENT
Start: 2019-04-12 | End: 2020-03-26

## 2019-05-06 ENCOUNTER — OFFICE VISIT (OUTPATIENT)
Dept: FAMILY MEDICINE CLINIC | Age: 69
End: 2019-05-06
Payer: COMMERCIAL

## 2019-05-06 VITALS
OXYGEN SATURATION: 97 % | BODY MASS INDEX: 23.19 KG/M2 | HEIGHT: 70 IN | SYSTOLIC BLOOD PRESSURE: 118 MMHG | RESPIRATION RATE: 20 BRPM | HEART RATE: 77 BPM | WEIGHT: 162 LBS | DIASTOLIC BLOOD PRESSURE: 68 MMHG

## 2019-05-06 DIAGNOSIS — I10 ESSENTIAL HYPERTENSION: ICD-10-CM

## 2019-05-06 DIAGNOSIS — K21.9 GASTROESOPHAGEAL REFLUX DISEASE WITHOUT ESOPHAGITIS: ICD-10-CM

## 2019-05-06 DIAGNOSIS — I27.82 OTHER CHRONIC PULMONARY EMBOLISM WITHOUT ACUTE COR PULMONALE (HCC): ICD-10-CM

## 2019-05-06 DIAGNOSIS — E78.2 MIXED HYPERLIPIDEMIA: ICD-10-CM

## 2019-05-06 DIAGNOSIS — Z72.0 TOBACCO ABUSE: ICD-10-CM

## 2019-05-06 PROCEDURE — 99214 OFFICE O/P EST MOD 30 MIN: CPT | Performed by: NURSE PRACTITIONER

## 2019-05-06 RX ORDER — AMLODIPINE BESYLATE 10 MG/1
10 TABLET ORAL DAILY
Qty: 30 TABLET | Refills: 11 | Status: SHIPPED | OUTPATIENT
Start: 2019-05-06 | End: 2020-04-01 | Stop reason: SDUPTHER

## 2019-05-06 ASSESSMENT — ENCOUNTER SYMPTOMS
DIARRHEA: 0
VOMITING: 0
NAUSEA: 0
ABDOMINAL PAIN: 0
CONSTIPATION: 0
COUGH: 0
SHORTNESS OF BREATH: 0
WHEEZING: 0
CHEST TIGHTNESS: 0

## 2019-05-06 ASSESSMENT — PATIENT HEALTH QUESTIONNAIRE - PHQ9
1. LITTLE INTEREST OR PLEASURE IN DOING THINGS: 0
SUM OF ALL RESPONSES TO PHQ QUESTIONS 1-9: 0
SUM OF ALL RESPONSES TO PHQ9 QUESTIONS 1 & 2: 0
SUM OF ALL RESPONSES TO PHQ QUESTIONS 1-9: 0
2. FEELING DOWN, DEPRESSED OR HOPELESS: 0

## 2019-05-06 NOTE — PROGRESS NOTES
SUBJECTIVE:    Delia Castleman  1950  77 y.o.  male      Chief Complaint   Patient presents with    3 Month Follow-Up     Pt here for 3 month follow up     HPI    No Known Allergies    Current Outpatient Medications   Medication Sig Dispense Refill    metoprolol succinate (TOPROL XL) 50 MG extended release tablet TAKE 1 TABLET BY MOUTH DAILY 30 tablet 11    pantoprazole (PROTONIX) 40 MG tablet TAKE 1 TABLET BY MOUTH DAILY 30 tablet 5    lisinopril (PRINIVIL;ZESTRIL) 20 MG tablet TAKE 1 TABLET BY MOUTH 2 TIMES DAILY 60 tablet 11    atorvastatin (LIPITOR) 80 MG tablet Take 1 tablet by mouth daily 30 tablet 11    amLODIPine (NORVASC) 10 MG tablet TAKE 1 TABLET BY MOUTH DAILY 30 tablet 11    nicotine (NICODERM CQ) 14 MG/24HR Place 1 patch onto the skin every 24 hours      Multiple Vitamin (MULTI VITAMIN DAILY PO) Take by mouth      aspirin 81 MG tablet Take 81 mg by mouth daily. No current facility-administered medications for this visit.            Past Medical History:   Diagnosis Date    Chronic rhinitis     Cough     GERD (gastroesophageal reflux disease)     Hx of blood clots     Hypertension     Hypertrophy of prostate     Pleuritic chest pain     Pulmonary emboli (HCC)     Tachycardia      Past Surgical History:   Procedure Laterality Date    COLONOSCOPY  08/19/2016    10 small colon polyps, int hem    COLONOSCOPY  09/06/2017    hems grade 2    FINGER SURGERY      LITHOTRIPSY      VASECTOMY       Social History     Socioeconomic History    Marital status:      Spouse name: None    Number of children: None    Years of education: None    Highest education level: None   Occupational History    None   Social Needs    Financial resource strain: None    Food insecurity:     Worry: None     Inability: None    Transportation needs:     Medical: None     Non-medical: None   Tobacco Use    Smoking status: Current Some Day Smoker     Packs/day: 0.50     Years: 50.00     Pack cough, chest tightness, shortness of breath and wheezing. Cardiovascular: Negative for chest pain, palpitations and leg swelling. Gastrointestinal: Negative for abdominal pain, constipation, diarrhea, nausea and vomiting. Musculoskeletal: Negative for arthralgias. Neurological: Negative for weakness, numbness and headaches. Hematological: Negative for adenopathy. OBJECTIVE:    /68 (Site: Right Upper Arm, Position: Sitting, Cuff Size: Medium Adult)   Pulse 77   Resp 20   Ht 5' 9.5\" (1.765 m)   Wt 162 lb (73.5 kg)   SpO2 97%   BMI 23.58 kg/m²     Physical Exam   Constitutional: He is oriented to person, place, and time. He appears well-developed and well-nourished. HENT:   Head: Normocephalic and atraumatic. Neck: Normal range of motion. Neck supple. No JVD present. Carotid bruit is not present. Cardiovascular: Normal rate, regular rhythm and normal heart sounds. Exam reveals no gallop and no friction rub. No murmur heard. Pulmonary/Chest: Effort normal and breath sounds normal. No respiratory distress. He has no wheezes. He has no rhonchi. He has no rales. Abdominal: Soft. Musculoskeletal: Normal range of motion. He exhibits no edema. Neurological: He is alert and oriented to person, place, and time. Skin: Skin is warm and dry. Psychiatric: He has a normal mood and affect. His behavior is normal.       ASSESSMENT/PLAN:    1. Tobacco abuse  counseled    2. Essential hypertension  Continue current medication. 3. Gastroesophageal reflux disease without esophagitis  Continue protonix. 4. Other chronic pulmonary embolism without acute cor pulmonale (HCC)  stable    5. Mixed hyperlipidemia  Continue lipitor. The patient is asked to make an attempt to improve diet and exercise patterns to aid in medical management of this problem. Return in about 6 months (around 11/6/2019).       (Please note that portions of this note may have been completed with a voice recognition program. Efforts were made to edit the dictations but occasionally words aremis-transcribed.)

## 2019-05-06 NOTE — ASSESSMENT & PLAN NOTE
Exercising \"at work\". Walking frequently at work. Remains on lipitor. Not making dietary changes.    The 10-year ASCVD risk score (Bam Ibanez, et al., 2013) is: 17.8%    Values used to calculate the score:      Age: 77 years      Sex: Male      Is Non- : No      Diabetic: No      Tobacco smoker: Yes      Systolic Blood Pressure: 073 mmHg      Is BP treated: Yes      HDL Cholesterol: 56 mg/dL      Total Cholesterol: 140 mg/dL

## 2019-09-26 ENCOUNTER — TELEPHONE (OUTPATIENT)
Dept: FAMILY MEDICINE CLINIC | Age: 69
End: 2019-09-26

## 2019-09-30 ENCOUNTER — OFFICE VISIT (OUTPATIENT)
Dept: FAMILY MEDICINE CLINIC | Age: 69
End: 2019-09-30
Payer: COMMERCIAL

## 2019-09-30 VITALS
BODY MASS INDEX: 21.51 KG/M2 | HEIGHT: 70 IN | HEART RATE: 88 BPM | DIASTOLIC BLOOD PRESSURE: 78 MMHG | SYSTOLIC BLOOD PRESSURE: 126 MMHG | RESPIRATION RATE: 16 BRPM | WEIGHT: 150.25 LBS

## 2019-09-30 DIAGNOSIS — I27.82 OTHER CHRONIC PULMONARY EMBOLISM WITHOUT ACUTE COR PULMONALE (HCC): ICD-10-CM

## 2019-09-30 DIAGNOSIS — Z72.0 TOBACCO ABUSE: ICD-10-CM

## 2019-09-30 DIAGNOSIS — K21.9 GASTROESOPHAGEAL REFLUX DISEASE WITHOUT ESOPHAGITIS: ICD-10-CM

## 2019-09-30 DIAGNOSIS — R93.0 ABNORMAL CT OF THE HEAD: ICD-10-CM

## 2019-09-30 DIAGNOSIS — E78.2 MIXED HYPERLIPIDEMIA: ICD-10-CM

## 2019-09-30 DIAGNOSIS — I10 ESSENTIAL HYPERTENSION: Primary | ICD-10-CM

## 2019-09-30 DIAGNOSIS — K08.9 DENTAL DISORDER: ICD-10-CM

## 2019-09-30 DIAGNOSIS — Z00.00 ROUTINE HEALTH MAINTENANCE: ICD-10-CM

## 2019-09-30 LAB
A/G RATIO: 2.1 (ref 1.1–2.2)
ALBUMIN SERPL-MCNC: 5 G/DL (ref 3.4–5)
ALP BLD-CCNC: 87 U/L (ref 40–129)
ALT SERPL-CCNC: 24 U/L (ref 10–40)
ANION GAP SERPL CALCULATED.3IONS-SCNC: 19 MMOL/L (ref 3–16)
AST SERPL-CCNC: 28 U/L (ref 15–37)
BASOPHILS ABSOLUTE: 0.1 K/UL (ref 0–0.2)
BASOPHILS RELATIVE PERCENT: 0.8 %
BILIRUB SERPL-MCNC: 0.5 MG/DL (ref 0–1)
BUN BLDV-MCNC: 13 MG/DL (ref 7–20)
CALCIUM SERPL-MCNC: 9.7 MG/DL (ref 8.3–10.6)
CHLORIDE BLD-SCNC: 100 MMOL/L (ref 99–110)
CHOLESTEROL, TOTAL: 139 MG/DL (ref 0–199)
CO2: 23 MMOL/L (ref 21–32)
CREAT SERPL-MCNC: 0.6 MG/DL (ref 0.8–1.3)
EOSINOPHILS ABSOLUTE: 0.2 K/UL (ref 0–0.6)
EOSINOPHILS RELATIVE PERCENT: 1.8 %
GFR AFRICAN AMERICAN: >60
GFR NON-AFRICAN AMERICAN: >60
GLOBULIN: 2.4 G/DL
GLUCOSE BLD-MCNC: 81 MG/DL (ref 70–99)
HCT VFR BLD CALC: 45.4 % (ref 40.5–52.5)
HDLC SERPL-MCNC: 79 MG/DL (ref 40–60)
HEMOGLOBIN: 15.2 G/DL (ref 13.5–17.5)
LDL CHOLESTEROL CALCULATED: 48 MG/DL
LYMPHOCYTES ABSOLUTE: 1.3 K/UL (ref 1–5.1)
LYMPHOCYTES RELATIVE PERCENT: 13.8 %
MCH RBC QN AUTO: 31.7 PG (ref 26–34)
MCHC RBC AUTO-ENTMCNC: 33.5 G/DL (ref 31–36)
MCV RBC AUTO: 94.6 FL (ref 80–100)
MONOCYTES ABSOLUTE: 0.9 K/UL (ref 0–1.3)
MONOCYTES RELATIVE PERCENT: 9.1 %
NEUTROPHILS ABSOLUTE: 7.2 K/UL (ref 1.7–7.7)
NEUTROPHILS RELATIVE PERCENT: 74.5 %
PDW BLD-RTO: 14.2 % (ref 12.4–15.4)
PLATELET # BLD: 324 K/UL (ref 135–450)
PMV BLD AUTO: 9.5 FL (ref 5–10.5)
POTASSIUM SERPL-SCNC: 4.9 MMOL/L (ref 3.5–5.1)
RBC # BLD: 4.8 M/UL (ref 4.2–5.9)
SODIUM BLD-SCNC: 142 MMOL/L (ref 136–145)
TOTAL PROTEIN: 7.4 G/DL (ref 6.4–8.2)
TRIGL SERPL-MCNC: 60 MG/DL (ref 0–150)
VLDLC SERPL CALC-MCNC: 12 MG/DL
WBC # BLD: 9.6 K/UL (ref 4–11)

## 2019-09-30 PROCEDURE — 99214 OFFICE O/P EST MOD 30 MIN: CPT | Performed by: NURSE PRACTITIONER

## 2019-09-30 PROCEDURE — 36415 COLL VENOUS BLD VENIPUNCTURE: CPT | Performed by: NURSE PRACTITIONER

## 2019-09-30 RX ORDER — PANTOPRAZOLE SODIUM 40 MG/1
40 TABLET, DELAYED RELEASE ORAL DAILY
Qty: 30 TABLET | Refills: 5 | Status: SHIPPED | OUTPATIENT
Start: 2019-09-30 | End: 2020-03-18

## 2019-09-30 ASSESSMENT — ENCOUNTER SYMPTOMS
NAUSEA: 0
COUGH: 0
DIARRHEA: 0
SHORTNESS OF BREATH: 0
VOMITING: 0
WHEEZING: 0
CONSTIPATION: 0
CHEST TIGHTNESS: 0
ABDOMINAL PAIN: 0

## 2019-09-30 NOTE — PROGRESS NOTES
pain     Pulmonary emboli (HCC)     Tachycardia      Past Surgical History:   Procedure Laterality Date    COLONOSCOPY  08/19/2016    10 small colon polyps, int hem    COLONOSCOPY  09/06/2017    hems grade 2    FINGER SURGERY      LITHOTRIPSY      VASECTOMY       Social History     Socioeconomic History    Marital status:      Spouse name: None    Number of children: None    Years of education: None    Highest education level: None   Occupational History    None   Social Needs    Financial resource strain: None    Food insecurity:     Worry: None     Inability: None    Transportation needs:     Medical: None     Non-medical: None   Tobacco Use    Smoking status: Current Some Day Smoker     Packs/day: 0.50     Years: 50.00     Pack years: 25.00     Types: Cigarettes    Smokeless tobacco: Never Used    Tobacco comment: Pt is not smoking at work only in evening. Substance and Sexual Activity    Alcohol use: Yes     Alcohol/week: 0.0 standard drinks     Comment: 4 beers day     Drug use: No    Sexual activity: Never   Lifestyle    Physical activity:     Days per week: None     Minutes per session: None    Stress: None   Relationships    Social connections:     Talks on phone: None     Gets together: None     Attends Druze service: None     Active member of club or organization: None     Attends meetings of clubs or organizations: None     Relationship status: None    Intimate partner violence:     Fear of current or ex partner: None     Emotionally abused: None     Physically abused: None     Forced sexual activity: None   Other Topics Concern    None   Social History Narrative    None         Tobacco abuse  Continues to smoke. Not ready to quit. Smoking has increased over the last week--off work right now and smoking more out of boredom. Pulmonary embolism (HCC)  Chronic scarring on xray. No SOB. No chest pain. Mixed hyperlipidemia  Remains on lipitor.  No formal physical activity. No dietary changes. The 10-year ASCVD risk score (Sea Hopkins, et al., 2013) is: 19.7%    Values used to calculate the score:      Age: 77 years      Sex: Male      Is Non- : No      Diabetic: No      Tobacco smoker: Yes      Systolic Blood Pressure: 487 mmHg      Is BP treated: Yes      HDL Cholesterol: 56 mg/dL      Total Cholesterol: 140 mg/dL      Gastroesophageal reflux disease without esophagitis  Stable with use of protonix    Essential hypertension  Stable. Patient denies any exertional chest pain, dyspnea, palpitations, syncope, orthopnea, edema or paroxysmal nocturnal dyspnea. Review of Systems   Constitutional: Negative for appetite change, chills, fatigue and unexpected weight change. HENT: Positive for dental problem. Respiratory: Negative for cough, chest tightness, shortness of breath and wheezing. Cardiovascular: Negative for chest pain, palpitations and leg swelling. Gastrointestinal: Negative for abdominal pain, constipation, diarrhea, nausea and vomiting. Musculoskeletal: Negative for arthralgias. Neurological: Negative for weakness, numbness and headaches. Hematological: Negative for adenopathy. OBJECTIVE:    /78 (Site: Right Upper Arm, Position: Sitting, Cuff Size: Medium Adult)   Pulse 88   Resp 16   Ht 5' 9.5\" (1.765 m)   Wt 150 lb 4 oz (68.2 kg)   BMI 21.87 kg/m²     Physical Exam   Constitutional: He is oriented to person, place, and time. He appears well-developed and well-nourished. HENT:   Head: Normocephalic and atraumatic. Mouth/Throat: Dental caries present. Neck: Normal range of motion. Neck supple. No JVD present. Carotid bruit is not present. Cardiovascular: Normal rate, regular rhythm and normal heart sounds. Exam reveals no gallop and no friction rub. No murmur heard. Pulmonary/Chest: Effort normal and breath sounds normal. No respiratory distress. He has no wheezes. He has no rhonchi.  He has

## 2019-09-30 NOTE — ASSESSMENT & PLAN NOTE
Continues to smoke. Not ready to quit. Smoking has increased over the last week--off work right now and smoking more out of boredom.

## 2019-10-03 ENCOUNTER — HOSPITAL ENCOUNTER (OUTPATIENT)
Dept: CT IMAGING | Age: 69
Discharge: HOME | End: 2019-10-03
Payer: COMMERCIAL

## 2019-10-03 DIAGNOSIS — K08.9 DENTAL DISORDER: ICD-10-CM

## 2019-10-03 DIAGNOSIS — R93.89 ABNORMAL CT SCAN: ICD-10-CM

## 2019-10-03 DIAGNOSIS — R93.89 ABNORMAL CT SCAN: Primary | ICD-10-CM

## 2019-10-03 DIAGNOSIS — R93.0 ABNORMAL CT OF THE HEAD: ICD-10-CM

## 2019-10-03 PROCEDURE — 70487 CT MAXILLOFACIAL W/DYE: CPT

## 2019-10-03 PROCEDURE — 6360000004 HC RX CONTRAST MEDICATION: Performed by: NURSE PRACTITIONER

## 2019-10-03 RX ADMIN — IOPAMIDOL 75 ML: 755 INJECTION, SOLUTION INTRAVENOUS at 08:10

## 2019-10-07 ENCOUNTER — TELEPHONE (OUTPATIENT)
Dept: FAMILY MEDICINE CLINIC | Age: 69
End: 2019-10-07

## 2019-10-07 NOTE — TELEPHONE ENCOUNTER
I can see preliminary result, but no final result yet. Patient has a periodontist he sees that requested the CT, but was unable to order. Her name is Maryjo Vuong fax the result when final result.

## 2020-03-18 RX ORDER — PANTOPRAZOLE SODIUM 40 MG/1
TABLET, DELAYED RELEASE ORAL
Qty: 30 TABLET | Refills: 5 | Status: SHIPPED | OUTPATIENT
Start: 2020-03-18 | End: 2020-09-08

## 2020-03-26 RX ORDER — LISINOPRIL 20 MG/1
20 TABLET ORAL 2 TIMES DAILY
Qty: 60 TABLET | Refills: 11 | Status: SHIPPED | OUTPATIENT
Start: 2020-03-26 | End: 2021-03-03 | Stop reason: SDUPTHER

## 2020-04-01 ENCOUNTER — OFFICE VISIT (OUTPATIENT)
Dept: FAMILY MEDICINE CLINIC | Age: 70
End: 2020-04-01
Payer: COMMERCIAL

## 2020-04-01 VITALS
DIASTOLIC BLOOD PRESSURE: 76 MMHG | TEMPERATURE: 98.2 F | WEIGHT: 154.6 LBS | SYSTOLIC BLOOD PRESSURE: 118 MMHG | HEART RATE: 87 BPM | OXYGEN SATURATION: 96 % | RESPIRATION RATE: 20 BRPM | BODY MASS INDEX: 22.5 KG/M2

## 2020-04-01 PROCEDURE — 99214 OFFICE O/P EST MOD 30 MIN: CPT | Performed by: NURSE PRACTITIONER

## 2020-04-01 RX ORDER — METOPROLOL SUCCINATE 50 MG/1
50 TABLET, EXTENDED RELEASE ORAL DAILY
Qty: 30 TABLET | Refills: 11 | Status: SHIPPED | OUTPATIENT
Start: 2020-04-01 | End: 2021-03-03 | Stop reason: SDUPTHER

## 2020-04-01 RX ORDER — AMLODIPINE BESYLATE 10 MG/1
10 TABLET ORAL DAILY
Qty: 30 TABLET | Refills: 11 | Status: SHIPPED | OUTPATIENT
Start: 2020-04-01 | End: 2021-03-03 | Stop reason: SDUPTHER

## 2020-04-01 ASSESSMENT — ENCOUNTER SYMPTOMS
CHEST TIGHTNESS: 0
VOMITING: 0
DIARRHEA: 0
SHORTNESS OF BREATH: 0
WHEEZING: 0
CONSTIPATION: 0
COUGH: 0
ABDOMINAL PAIN: 0
NAUSEA: 0

## 2020-04-01 ASSESSMENT — PATIENT HEALTH QUESTIONNAIRE - PHQ9
1. LITTLE INTEREST OR PLEASURE IN DOING THINGS: 0
SUM OF ALL RESPONSES TO PHQ9 QUESTIONS 1 & 2: 0
SUM OF ALL RESPONSES TO PHQ QUESTIONS 1-9: 0
SUM OF ALL RESPONSES TO PHQ QUESTIONS 1-9: 0
2. FEELING DOWN, DEPRESSED OR HOPELESS: 0

## 2020-04-01 NOTE — ASSESSMENT & PLAN NOTE
Currently on lipitor, aspirin  The 10-year ASCVD risk score (Micah Smith, et al., 2013) is: 16.3%    Values used to calculate the score:      Age: 68 years      Sex: Male      Is Non- : No      Diabetic: No      Tobacco smoker: Yes      Systolic Blood Pressure: 098 mmHg      Is BP treated: Yes      HDL Cholesterol: 79 mg/dL      Total Cholesterol: 139 mg/dL

## 2020-05-19 ENCOUNTER — HOSPITAL ENCOUNTER (OUTPATIENT)
Age: 70
Setting detail: SPECIMEN
Discharge: HOME | End: 2020-05-19
Payer: COMMERCIAL

## 2020-05-19 ENCOUNTER — OFFICE VISIT (OUTPATIENT)
Dept: PRIMARY CARE CLINIC | Age: 70
End: 2020-05-19
Payer: COMMERCIAL

## 2020-05-19 VITALS — TEMPERATURE: 97.2 F | OXYGEN SATURATION: 99 % | HEART RATE: 90 BPM

## 2020-05-19 PROCEDURE — 99212 OFFICE O/P EST SF 10 MIN: CPT | Performed by: FAMILY MEDICINE

## 2020-05-19 PROCEDURE — U0002 COVID-19 LAB TEST NON-CDC: HCPCS

## 2020-05-19 NOTE — PROGRESS NOTES
5/19/20  Chioma Pal  1950     Employer: [employer]    FLU/COVID-19 CLINIC EVALUATION    HPI SYMPTOMS:  67 yo M presenting for symptoms below. States that yesterday, he ate spaghetti and felt like he had indigestion/reflux. Woke up with abdominal pain and sweats in the middle of the night and took some of his reflux medication, which helped. States symptoms felt better as long as he stayed upright. Is feeling much better today. Called his PCP but was told they could not see anyone with a fever and that he needed to come here first.  Patient denies any URI symptoms, feeling well currently. [] Fevers  [] Chills  [] Cough  [] Coughing up blood  [] Chest Congestion  [] Nasal Congestion  [] Feeling short of breath  [] Sometimes  [] Frequently  [] All the time  [] Chest pain  [x] Headaches  [x]Tolerable  [] Severe  [] Sore throat  [] Muscle aches  [] Nausea  [] Vomiting  []Unable to keep fluids down  [] Diarrhea  []Severe    [x] OTHER SYMPTOMS: fatigue, sweats, and abdominal pain      Symptom Duration:   [x] 1  [] 2   [] 3   [] 4    [] 5   [] 6   [] 7   [] 8   [] 9   [] 10   [] 11   [] 12   [] 13   [] 14   [] Longer than 14 days    Symptom course:   [] Worsening     [] Stable     [x] Improving    RISK FACTORS:    [] Pregnant or possibly pregnant  [x] Age over 61  [] Diabetes  [] Heart disease  [] Asthma  [] COPD/Other chronic lung diseases  [] Active Cancer  [] On Chemotherapy  [] Taking oral steroids  [] History Lymphoma/Leukemia  [] Close contact with a lab confirmed COVID-19 patient within 14 days of symptom onset  [] History of travel from affected geographical areas within 14 days of symptom onset       VITALS:  Vitals:    05/19/20 1237   Pulse: 90   Temp: 97.2 °F (36.2 °C)   SpO2: 99%      Physical Exam  Vitals signs and nursing note reviewed. Constitutional:       General: He is not in acute distress. Appearance: Normal appearance.  He is not ill-appearing, toxic-appearing or diaphoretic. HENT:      Head: Normocephalic and atraumatic. Nose: Nose normal.      Mouth/Throat:      Pharynx: Oropharynx is clear. No oropharyngeal exudate or posterior oropharyngeal erythema. Eyes:      General:         Right eye: No discharge. Left eye: No discharge. Conjunctiva/sclera: Conjunctivae normal.   Cardiovascular:      Rate and Rhythm: Normal rate and regular rhythm. Heart sounds: Normal heart sounds. Pulmonary:      Effort: Pulmonary effort is normal.      Breath sounds: Normal breath sounds. No wheezing or rales. Skin:     General: Skin is warm and dry. Neurological:      Mental Status: He is alert. TESTS:  COVID pending  POCT FLU:  [] Positive     []Negative    ASSESSMENT:  Symptoms seem consistent with GERD, now mostly improved. Advised following up with PCP if symptoms recur. COVID ordered per request.  [] Flu  [] Possible COVID-19  [] Strep    PLAN:    [x] Discharge home with written instructions for:  [] Flu management  [x] Possible COVID-19 infection with self-quarantine and management of symptoms  [x] Follow-up with primary care physician or emergency department if worsens  [] Evaluation per physician/nurse practitioner in clinic  [] Sent to ER       An  electronic signature was used to authenticate this note.      --Zohreh Ventura MD on 5/19/2020 at 12:37 PM

## 2020-05-21 LAB
SARS-COV-2: NOT DETECTED
SOURCE: NORMAL

## 2020-09-08 RX ORDER — PANTOPRAZOLE SODIUM 40 MG/1
TABLET, DELAYED RELEASE ORAL
Qty: 30 TABLET | Refills: 5 | Status: SHIPPED | OUTPATIENT
Start: 2020-09-08 | End: 2021-02-02 | Stop reason: SDUPTHER

## 2020-09-30 ENCOUNTER — OFFICE VISIT (OUTPATIENT)
Dept: FAMILY MEDICINE CLINIC | Age: 70
End: 2020-09-30
Payer: COMMERCIAL

## 2020-09-30 VITALS
OXYGEN SATURATION: 96 % | TEMPERATURE: 98.2 F | WEIGHT: 153.8 LBS | RESPIRATION RATE: 16 BRPM | BODY MASS INDEX: 22.39 KG/M2 | SYSTOLIC BLOOD PRESSURE: 114 MMHG | HEART RATE: 89 BPM | DIASTOLIC BLOOD PRESSURE: 76 MMHG

## 2020-09-30 DIAGNOSIS — Z72.0 TOBACCO ABUSE: ICD-10-CM

## 2020-09-30 DIAGNOSIS — Z12.5 SCREENING PSA (PROSTATE SPECIFIC ANTIGEN): ICD-10-CM

## 2020-09-30 DIAGNOSIS — I10 ESSENTIAL HYPERTENSION: ICD-10-CM

## 2020-09-30 DIAGNOSIS — E78.2 MIXED HYPERLIPIDEMIA: ICD-10-CM

## 2020-09-30 DIAGNOSIS — R63.4 WEIGHT LOSS: ICD-10-CM

## 2020-09-30 LAB
A/G RATIO: 1.4 (ref 1.1–2.2)
ALBUMIN SERPL-MCNC: 4.1 G/DL (ref 3.4–5)
ALP BLD-CCNC: 79 U/L (ref 40–129)
ALT SERPL-CCNC: 25 U/L (ref 10–40)
ANION GAP SERPL CALCULATED.3IONS-SCNC: 11 MMOL/L (ref 3–16)
AST SERPL-CCNC: 28 U/L (ref 15–37)
BASOPHILS ABSOLUTE: 0.1 K/UL (ref 0–0.2)
BASOPHILS RELATIVE PERCENT: 0.8 %
BILIRUB SERPL-MCNC: 0.3 MG/DL (ref 0–1)
BUN BLDV-MCNC: 14 MG/DL (ref 7–20)
C-REACTIVE PROTEIN: 5.3 MG/L (ref 0–5.1)
CALCIUM SERPL-MCNC: 9.1 MG/DL (ref 8.3–10.6)
CHLORIDE BLD-SCNC: 101 MMOL/L (ref 99–110)
CHOLESTEROL, TOTAL: 123 MG/DL (ref 0–199)
CO2: 26 MMOL/L (ref 21–32)
CREAT SERPL-MCNC: 0.7 MG/DL (ref 0.8–1.3)
EOSINOPHILS ABSOLUTE: 0.2 K/UL (ref 0–0.6)
EOSINOPHILS RELATIVE PERCENT: 3 %
GFR AFRICAN AMERICAN: >60
GFR NON-AFRICAN AMERICAN: >60
GLOBULIN: 3 G/DL
GLUCOSE BLD-MCNC: 89 MG/DL (ref 70–99)
HCT VFR BLD CALC: 42.1 % (ref 40.5–52.5)
HDLC SERPL-MCNC: 64 MG/DL (ref 40–60)
HEMOGLOBIN: 14.4 G/DL (ref 13.5–17.5)
LDL CHOLESTEROL CALCULATED: 49 MG/DL
LYMPHOCYTES ABSOLUTE: 1.6 K/UL (ref 1–5.1)
LYMPHOCYTES RELATIVE PERCENT: 21.9 %
MCH RBC QN AUTO: 31.6 PG (ref 26–34)
MCHC RBC AUTO-ENTMCNC: 34.2 G/DL (ref 31–36)
MCV RBC AUTO: 92.5 FL (ref 80–100)
MONOCYTES ABSOLUTE: 0.8 K/UL (ref 0–1.3)
MONOCYTES RELATIVE PERCENT: 10.9 %
NEUTROPHILS ABSOLUTE: 4.8 K/UL (ref 1.7–7.7)
NEUTROPHILS RELATIVE PERCENT: 63.4 %
PDW BLD-RTO: 14 % (ref 12.4–15.4)
PLATELET # BLD: 265 K/UL (ref 135–450)
PMV BLD AUTO: 9 FL (ref 5–10.5)
POTASSIUM SERPL-SCNC: 4.3 MMOL/L (ref 3.5–5.1)
PROSTATE SPECIFIC ANTIGEN: 0.66 NG/ML (ref 0–4)
RBC # BLD: 4.55 M/UL (ref 4.2–5.9)
SEDIMENTATION RATE, ERYTHROCYTE: 18 MM/HR (ref 0–20)
SODIUM BLD-SCNC: 138 MMOL/L (ref 136–145)
TOTAL PROTEIN: 7.1 G/DL (ref 6.4–8.2)
TRIGL SERPL-MCNC: 52 MG/DL (ref 0–150)
TSH REFLEX: 1.67 UIU/ML (ref 0.27–4.2)
VLDLC SERPL CALC-MCNC: 10 MG/DL
WBC # BLD: 7.5 K/UL (ref 4–11)

## 2020-09-30 PROCEDURE — 99214 OFFICE O/P EST MOD 30 MIN: CPT | Performed by: NURSE PRACTITIONER

## 2020-09-30 ASSESSMENT — ENCOUNTER SYMPTOMS
ABDOMINAL PAIN: 0
DIARRHEA: 0
CHEST TIGHTNESS: 0
CONSTIPATION: 0
WHEEZING: 0
COUGH: 0
BLOOD IN STOOL: 0
SHORTNESS OF BREATH: 0
VOMITING: 0
NAUSEA: 0

## 2020-09-30 ASSESSMENT — PATIENT HEALTH QUESTIONNAIRE - PHQ9
SUM OF ALL RESPONSES TO PHQ QUESTIONS 1-9: 0
SUM OF ALL RESPONSES TO PHQ QUESTIONS 1-9: 0
SUM OF ALL RESPONSES TO PHQ9 QUESTIONS 1 & 2: 0
2. FEELING DOWN, DEPRESSED OR HOPELESS: 0
1. LITTLE INTEREST OR PLEASURE IN DOING THINGS: 0

## 2020-09-30 NOTE — PROGRESS NOTES
SUBJECTIVE:    Wilber Cazares  1950  68 y.o.  male      Chief Complaint   Patient presents with    6 Month Follow-Up     Pt here for 6 month follow up     HPI       Discussed weight loss. Patient not concerned. He did not notice. He has gained some weight back. No abdominal pain, n/v/d. No cough, SOB, chest pain. No change in appetite. He does have a h/o of smoking. He walks three miles a day at work. No dietary changes. No Known Allergies    Current Outpatient Medications   Medication Sig Dispense Refill    pantoprazole (PROTONIX) 40 MG tablet TAKE 1 TABLET BY MOUTH EVERY DAY 30 tablet 5    metoprolol succinate (TOPROL XL) 50 MG extended release tablet TAKE 1 TABLET BY MOUTH DAILY 30 tablet 11    amLODIPine (NORVASC) 10 MG tablet Take 1 tablet by mouth daily 30 tablet 11    lisinopril (PRINIVIL;ZESTRIL) 20 MG tablet TAKE 1 TABLET BY MOUTH 2 TIMES DAILY 60 tablet 11    atorvastatin (LIPITOR) 80 MG tablet TAKE 1 TABLET BY MOUTH EVERY DAY 30 tablet 11    nicotine (NICODERM CQ) 14 MG/24HR Place 1 patch onto the skin every 24 hours      Multiple Vitamin (MULTI VITAMIN DAILY PO) Take by mouth      aspirin 81 MG tablet Take 81 mg by mouth daily. No current facility-administered medications for this visit.            Past Medical History:   Diagnosis Date    Chronic rhinitis     Cough     GERD (gastroesophageal reflux disease)     Hx of blood clots     Hypertension     Hypertrophy of prostate     Pleuritic chest pain     Pulmonary emboli (HCC)     Tachycardia      Past Surgical History:   Procedure Laterality Date    COLONOSCOPY  08/19/2016    10 small colon polyps, int hem    COLONOSCOPY  09/06/2017    hems grade 2    FINGER SURGERY      LITHOTRIPSY      VASECTOMY       Social History     Socioeconomic History    Marital status:      Spouse name: None    Number of children: None    Years of education: None    Highest education level: None   Occupational History    None Social Needs    Financial resource strain: None    Food insecurity     Worry: None     Inability: None    Transportation needs     Medical: None     Non-medical: None   Tobacco Use    Smoking status: Current Some Day Smoker     Packs/day: 0.50     Years: 50.00     Pack years: 25.00     Types: Cigarettes    Smokeless tobacco: Never Used    Tobacco comment: Pt is not smoking at work only in evening. Substance and Sexual Activity    Alcohol use: Yes     Alcohol/week: 0.0 standard drinks     Comment: 4 beers day     Drug use: No    Sexual activity: Never   Lifestyle    Physical activity     Days per week: None     Minutes per session: None    Stress: None   Relationships    Social connections     Talks on phone: None     Gets together: None     Attends Christianity service: None     Active member of club or organization: None     Attends meetings of clubs or organizations: None     Relationship status: None    Intimate partner violence     Fear of current or ex partner: None     Emotionally abused: None     Physically abused: None     Forced sexual activity: None   Other Topics Concern    None   Social History Narrative    None         Pulmonary embolism ([address]  The patient denies cough, chest pain, dyspnea, wheezing or hemoptysis. Mixed hyperlipidemia  Remains on lipitor. Walking three miles a day at work. No dietary changes. Essential hypertension  Stable. Patient denies any exertional chest pain, dyspnea, palpitations, syncope, orthopnea, edema or paroxysmal nocturnal dyspnea. Review of Systems   Constitutional: Negative for appetite change, chills, fatigue, fever and unexpected weight change. Respiratory: Negative for cough, chest tightness, shortness of breath and wheezing. Cardiovascular: Negative for chest pain, palpitations and leg swelling. Gastrointestinal: Negative for abdominal pain, blood in stool, constipation, diarrhea, nausea and vomiting.    Musculoskeletal: Negative for arthralgias. Neurological: Negative for weakness, numbness and headaches. Hematological: Negative for adenopathy. OBJECTIVE:    /76 (Site: Left Upper Arm, Position: Sitting, Cuff Size: Medium Adult)   Pulse 89   Temp 98.2 °F (36.8 °C) (Infrared)   Resp 16   Wt 153 lb 12.8 oz (69.8 kg)   SpO2 96%   BMI 22.39 kg/m²     Physical Exam  Constitutional:       Appearance: Normal appearance. He is well-developed. HENT:      Head: Normocephalic and atraumatic. Right Ear: Hearing normal.      Left Ear: Hearing normal.   Neck:      Musculoskeletal: Normal range of motion and neck supple. Vascular: No carotid bruit or JVD. Cardiovascular:      Rate and Rhythm: Normal rate and regular rhythm. Heart sounds: Normal heart sounds. No murmur. No friction rub. No gallop. Pulmonary:      Effort: Pulmonary effort is normal. No respiratory distress. Breath sounds: Normal breath sounds. No wheezing, rhonchi or rales. Abdominal:      Palpations: Abdomen is soft. Musculoskeletal: Normal range of motion. Skin:     General: Skin is warm and dry. Neurological:      General: No focal deficit present. Mental Status: He is alert and oriented to person, place, and time. Psychiatric:         Mood and Affect: Mood normal.         Behavior: Behavior normal. Behavior is cooperative. Thought Content: Thought content normal.         ASSESSMENT/PLAN:    1. Weight loss  Labs as ordered. Referred for colonoscopy. Return in one month  - [address]; Future  - SEDIMENTATION RATE; Future  - C-REACTIVE PROTEIN; Future  - TSH with Reflex; Future    2. Lung nodule  CT as ordered  - CT CHEST W WO CONTRAST; Future    3. Mixed hyperlipidemia  - Lipid Panel; Future    4. Essential hypertension  - CBC Auto Differential; Future  - Comprehensive Metabolic Panel; Future    5. Tobacco abuse  Counseled on cessation  - CBC Auto Differential; Future    6.  Colon cancer screening  - [facility], [place]    7. Screening PSA (prostate specific antigen)  - Psa screening; Future    8. Other chronic pulmonary embolism without acute cor pulmonale ([facility] [address]           Return in about 4 weeks (around 10/28/2020).       (Please note that portions of this note may have been completed with a voice recognition program. Efforts were made to edit the dictations but occasionally words aremis-transcribed.)

## 2020-10-02 ENCOUNTER — TELEPHONE (OUTPATIENT)
Dept: GASTROENTEROLOGY | Age: 70
End: 2020-10-02

## 2020-10-19 ENCOUNTER — OFFICE VISIT (OUTPATIENT)
Dept: GASTROENTEROLOGY | Age: 70
End: 2020-10-19
Payer: COMMERCIAL

## 2020-10-19 VITALS
HEIGHT: 70 IN | OXYGEN SATURATION: 98 % | TEMPERATURE: 98.2 F | DIASTOLIC BLOOD PRESSURE: 78 MMHG | HEART RATE: 84 BPM | BODY MASS INDEX: 22.15 KG/M2 | SYSTOLIC BLOOD PRESSURE: 108 MMHG | WEIGHT: 154.7 LBS

## 2020-10-19 PROCEDURE — 99202 OFFICE O/P NEW SF 15 MIN: CPT | Performed by: NURSE PRACTITIONER

## 2020-10-19 RX ORDER — POLYETHYLENE GLYCOL 3350 17 G/17G
238 POWDER, FOR SOLUTION ORAL ONCE
Qty: 1 BOTTLE | Refills: 0 | Status: SHIPPED | OUTPATIENT
Start: 2020-10-19 | End: 2020-10-19

## 2020-10-19 RX ORDER — BISACODYL 5 MG
TABLET, DELAYED RELEASE (ENTERIC COATED) ORAL
Qty: 4 TABLET | Refills: 0 | Status: SHIPPED | OUTPATIENT
Start: 2020-10-19 | End: 2020-11-06

## 2020-10-19 ASSESSMENT — ENCOUNTER SYMPTOMS
SHORTNESS OF BREATH: 0
CONSTIPATION: 0
EYE DISCHARGE: 0
WHEEZING: 0
VOMITING: 0
BACK PAIN: 0
ABDOMINAL PAIN: 0
COUGH: 0
DIARRHEA: 0
EYE PAIN: 0
BLOOD IN STOOL: 0
NAUSEA: 0
COLOR CHANGE: 0

## 2020-10-19 NOTE — PROGRESS NOTES
Lynn Mullen 68 y.o. male was seen by BASILIO Palacios on 10/19/2020     Wt Readings from Last 3 Encounters:   10/19/20 154 lb 11.2 oz (70.2 kg)   09/30/20 153 lb 12.8 oz (69.8 kg)   04/01/20 154 lb 9.6 oz (70.1 kg)       JENNY Mullen is a pleasant 68 y.o.  male who presents today for history of colon polyps to schedule a colonoscopy. He has a past medical history of chronic rhinitis, cough, colon polyps, GERD (gastroesophageal reflux disease), hx of blood clots, hypertension, hypertrophy of prostate, pleuritic chest pain, pulmonary emboli, and tachycardia. His last colonoscopy was done by Dr. Debi De La Torre on 9-6-2017 showing hemorrhoids otherwise normal colonoscopy. His colonoscopy done by Dr. Lydia Nava on 8- showed multiple adenomatous colon polyps, 2 cm sessile adenomatous colon polyp from transverse colon and 2 cm tubulovillous adenoma from descending colon polyp that were removed. He denies changes in his bowel pattern. His typical bowel pattern is daily with soft brown formed stools. No diarrhea or constipation. No blood in his stools or black tarry stools. No excess belching or flatulence. His appetite is good without early satiety. His weight is stable. No nausea or vomiting. His heartburn and acid reflux is controlled with Protonix. No nocturnal awakenings with acid reflux. No dysphagia or pain with swallowing. No abdominal pain, bloating or distention. No family history of stomach or colon cancer. ROS  Review of Systems   Constitutional: Positive for fatigue. Negative for appetite change, chills, diaphoresis, fever and unexpected weight change. HENT: Negative for ear pain, hearing loss and tinnitus. Eyes: Positive for visual disturbance. Negative for pain and discharge. Respiratory: Negative for cough, shortness of breath and wheezing. Cardiovascular: Negative for chest pain, palpitations and leg swelling.    Gastrointestinal: Negative for abdominal pain, blood in stool, constipation, diarrhea, nausea and vomiting. Endocrine: Negative for cold intolerance and heat intolerance. Genitourinary: Negative for dysuria, frequency, hematuria and urgency. Musculoskeletal: Negative for back pain, myalgias and neck pain. Skin: Negative for color change, pallor and rash. Allergic/Immunologic: Negative for environmental allergies and food allergies. Neurological: Negative for dizziness, seizures, weakness and headaches. Hematological: Bruises/bleeds easily. Psychiatric/Behavioral: Negative for dysphoric mood and sleep disturbance. The patient is not nervous/anxious. Allergies  No Known Allergies    Medications  Current Outpatient Medications   Medication Sig Dispense Refill    pantoprazole (PROTONIX) 40 MG tablet TAKE 1 TABLET BY MOUTH EVERY DAY 30 tablet 5    metoprolol succinate (TOPROL XL) 50 MG extended release tablet TAKE 1 TABLET BY MOUTH DAILY 30 tablet 11    amLODIPine (NORVASC) 10 MG tablet Take 1 tablet by mouth daily 30 tablet 11    lisinopril (PRINIVIL;ZESTRIL) 20 MG tablet TAKE 1 TABLET BY MOUTH 2 TIMES DAILY 60 tablet 11    atorvastatin (LIPITOR) 80 MG tablet TAKE 1 TABLET BY MOUTH EVERY DAY 30 tablet 11    nicotine (NICODERM CQ) 14 MG/24HR Place 1 patch onto the skin every 24 hours      Multiple Vitamin (MULTI VITAMIN DAILY PO) Take by mouth      aspirin 81 MG tablet Take 81 mg by mouth daily. No current facility-administered medications for this visit. Past medical history:   He has a past medical history of Chronic rhinitis, Cough, GERD (gastroesophageal reflux disease), Hx of blood clots, Hypertension, Hypertrophy of prostate, Pleuritic chest pain, Pulmonary emboli ([address] and Tachycardia. Past surgical history:  He has a past surgical history that includes Finger surgery; Vasectomy; Lithotripsy; Colonoscopy (08/19/2016); and Colonoscopy (09/06/2017).     Social History:  He reports that he has been smoking cigarettes. He has a 25.00 pack-year smoking history. He has never used smokeless tobacco. He reports current alcohol use. He reports that he does not use drugs. Family history:  His family history includes COPD in his father; Other in his mother. Objective    Vitals:    10/19/20 1455   BP: 108/78   Pulse: 84   Temp: 98.2 °F (36.8 °C)   SpO2: 98%        Physical exam    Physical Exam  Vitals signs reviewed. Constitutional:       General: He is not in acute distress. Appearance: Normal appearance. He is well-developed. He is not ill-appearing, toxic-appearing or diaphoretic. HENT:      Head: Normocephalic and atraumatic. Eyes:      Conjunctiva/sclera: Conjunctivae normal.      Pupils: Pupils are equal, round, and reactive to light. Neck:      Musculoskeletal: Normal range of motion and neck supple. Thyroid: No thyromegaly. Vascular: No JVD. Trachea: No tracheal deviation. Cardiovascular:      Rate and Rhythm: Normal rate and regular rhythm. Pulses: Normal pulses. Heart sounds: Normal heart sounds. No murmur. No friction rub. No gallop. Pulmonary:      Effort: Pulmonary effort is normal. No respiratory distress. Breath sounds: Normal breath sounds. No stridor. No wheezing, rhonchi or rales. Chest:      Chest wall: No tenderness. Abdominal:      General: Bowel sounds are normal. There is no distension. Palpations: Abdomen is soft. There is no mass. Tenderness: There is no abdominal tenderness. There is no guarding or rebound. Hernia: No hernia is present. Musculoskeletal: Normal range of motion. Lymphadenopathy:      Cervical: No cervical adenopathy. Skin:     General: Skin is warm and dry. Neurological:      Mental Status: He is alert and oriented to person, place, and time.    Psychiatric:         Mood and Affect: Mood normal.         Orders Only on 09/30/2020   Component Date Value Ref Range Status    WBC 09/30/2020 7.5  4.0 - 11.0 K/uL Final    RBC 09/30/2020 4.55  4.20 - 5.90 M/uL Final    Hemoglobin 09/30/2020 14.4  13.5 - 17.5 g/dL Final    Hematocrit 09/30/2020 42.1  40.5 - 52.5 % Final    MCV 09/30/2020 92.5  80.0 - 100.0 fL Final    MCH 09/30/2020 31.6  26.0 - 34.0 pg Final    MCHC 09/30/2020 34.2  31.0 - 36.0 g/dL Final    RDW 09/30/2020 14.0  12.4 - 15.4 % Final    Platelets 33/88/7273 265  135 - 450 K/uL Final    MPV 09/30/2020 9.0  5.0 - 10.5 fL Final    Neutrophils % 09/30/2020 63.4  % Final    Lymphocytes % 09/30/2020 21.9  % Final    Monocytes % 09/30/2020 10.9  % Final    Eosinophils % 09/30/2020 3.0  % Final    Basophils % 09/30/2020 0.8  % Final    Neutrophils Absolute 09/30/2020 4.8  1.7 - 7.7 K/uL Final    Lymphocytes Absolute 09/30/2020 1.6  1.0 - 5.1 K/uL Final    Monocytes Absolute 09/30/2020 0.8  0.0 - 1.3 K/uL Final    Eosinophils Absolute 09/30/2020 0.2  0.0 - 0.6 K/uL Final    Basophils Absolute 09/30/2020 0.1  0.0 - 0.2 K/uL Final    Sodium 09/30/2020 138  136 - 145 mmol/L Final    Potassium 09/30/2020 4.3  3.5 - 5.1 mmol/L Final    Chloride 09/30/2020 101  99 - 110 mmol/L Final    CO2 09/30/2020 26  21 - 32 mmol/L Final    Anion Gap 09/30/2020 11  3 - 16 Final    Glucose 09/30/2020 89  70 - 99 mg/dL Final    BUN 09/30/2020 14  7 - 20 mg/dL Final    CREATININE 09/30/2020 0.7* 0.8 - 1.3 mg/dL Final    GFR Non- 09/30/2020 >60  >60 Final    Comment: >60 mL/min/1.73m2 EGFR, calc. for ages 25 and older using the  MDRD formula (not corrected for weight), is valid for stable  renal function.  GFR  09/30/2020 >60  >60 Final    Comment: Chronic Kidney Disease: less than 60 ml/min/1.73 sq.m. Kidney Failure: less than 15 ml/min/1.73 sq.m. Results valid for patients 18 years and older.       Calcium 09/30/2020 9.1  8.3 - 10.6 mg/dL Final    Total Protein 09/30/2020 7.1  6.4 - 8.2 g/dL Final    Alb 09/30/2020 4.1  3.4 - 5.0 g/dL Final    Albumin/Globulin Ratio 09/30/2020 1.4  1.1 - 2.2 Final    Total Bilirubin 09/30/2020 0.3  0.0 - 1.0 mg/dL Final    Alkaline Phosphatase 09/30/2020 79  40 - 129 U/L Final    ALT 09/30/2020 25  10 - 40 U/L Final    AST 09/30/2020 28  15 - 37 U/L Final    Globulin 09/30/2020 3.0  g/dL Final    Cholesterol, Total 09/30/2020 123  0 - 199 mg/dL Final    Triglycerides 09/30/2020 52  0 - 150 mg/dL Final    HDL 09/30/2020 64* 40 - 60 mg/dL Final    LDL Calculated 09/30/2020 49  <100 mg/dL Final    VLDL Cholesterol Calculated 09/30/2020 10  Not Established mg/dL Final    Sed Rate 09/30/2020 18  0 - 20 mm/Hr Final    CRP 09/30/2020 5.3* 0.0 - 5.1 mg/L Final    Comment: [facility]-CRP Reference Range:  0D-29D      <0.6  30U-301K    <5.1    CRP is used in the detection and evaluation of infection, tissue injury,  inflammatory disorders and associated diseases. Increases in CRP values  are non-specific and should not be interpreted without a complete  clinical evaluation.  TSH 09/30/2020 1.67  0.27 - 4.20 uIU/mL Final    PSA 09/30/2020 0.66  0.00 - 4.00 ng/mL Final       Assessment and Plan:  1. Will plan for a colonoscopy with MAC anesthesia. The patient was informed of the risks and benefits of the procedure. 2.  History of adenomatous colon polyps recommend colonoscopy to rule out colorectal cancer. The patient was encouraged to continue with good bowel regimen and increase fruit, fiber and fluids. 3.  Hemorrhoids noted on colonoscopy. The patient denies pain, irritation or bleeding. Recommend good bowel regimen, avoid straining with bowel movements and sitting on the toilet for long periods of time. 4.  Further recommendations for follow-up will be determined after the colonoscopy has been completed.

## 2020-10-20 ENCOUNTER — PREP FOR PROCEDURE (OUTPATIENT)
Dept: GASTROENTEROLOGY | Age: 70
End: 2020-10-20

## 2020-10-20 RX ORDER — SODIUM CHLORIDE, SODIUM LACTATE, POTASSIUM CHLORIDE, CALCIUM CHLORIDE 600; 310; 30; 20 MG/100ML; MG/100ML; MG/100ML; MG/100ML
INJECTION, SOLUTION INTRAVENOUS CONTINUOUS
Status: CANCELLED | OUTPATIENT
Start: 2020-10-20

## 2020-10-20 RX ORDER — SODIUM CHLORIDE 0.9 % (FLUSH) 0.9 %
10 SYRINGE (ML) INJECTION PRN
Status: CANCELLED | OUTPATIENT
Start: 2020-10-20

## 2020-10-20 RX ORDER — SODIUM CHLORIDE 0.9 % (FLUSH) 0.9 %
10 SYRINGE (ML) INJECTION EVERY 12 HOURS SCHEDULED
Status: CANCELLED | OUTPATIENT
Start: 2020-10-20

## 2020-10-26 ENCOUNTER — HOSPITAL ENCOUNTER (OUTPATIENT)
Dept: CT IMAGING | Age: 70
Discharge: HOME | End: 2020-10-26
Payer: COMMERCIAL

## 2020-10-26 PROCEDURE — 71250 CT THORAX DX C-: CPT

## 2020-10-28 ENCOUNTER — OFFICE VISIT (OUTPATIENT)
Dept: FAMILY MEDICINE CLINIC | Age: 70
End: 2020-10-28
Payer: COMMERCIAL

## 2020-10-28 VITALS
RESPIRATION RATE: 18 BRPM | DIASTOLIC BLOOD PRESSURE: 78 MMHG | HEIGHT: 70 IN | OXYGEN SATURATION: 96 % | TEMPERATURE: 97.1 F | WEIGHT: 152.25 LBS | BODY MASS INDEX: 21.8 KG/M2 | HEART RATE: 102 BPM | SYSTOLIC BLOOD PRESSURE: 112 MMHG

## 2020-10-28 PROCEDURE — 99214 OFFICE O/P EST MOD 30 MIN: CPT | Performed by: NURSE PRACTITIONER

## 2020-10-28 ASSESSMENT — PATIENT HEALTH QUESTIONNAIRE - PHQ9
SUM OF ALL RESPONSES TO PHQ QUESTIONS 1-9: 0
2. FEELING DOWN, DEPRESSED OR HOPELESS: 0
SUM OF ALL RESPONSES TO PHQ9 QUESTIONS 1 & 2: 0
SUM OF ALL RESPONSES TO PHQ QUESTIONS 1-9: 0
SUM OF ALL RESPONSES TO PHQ QUESTIONS 1-9: 0
1. LITTLE INTEREST OR PLEASURE IN DOING THINGS: 0

## 2020-10-28 ASSESSMENT — ENCOUNTER SYMPTOMS
DIARRHEA: 0
CHEST TIGHTNESS: 0
NAUSEA: 0
WHEEZING: 0
CONSTIPATION: 0
BLOOD IN STOOL: 0
VOMITING: 0
SHORTNESS OF BREATH: 0
ABDOMINAL PAIN: 0
COUGH: 0

## 2020-10-28 NOTE — PROGRESS NOTES
SUBJECTIVE:    Esta Solid  1950  68 y.o.  male      Chief Complaint   Patient presents with    1 Month Follow-Up     Pt is here for a 4 week follow up     HPI        Down one lb in the last month. Patient not concerned. He did not notice. No abdominal pain, n/v/d. No cough, SOB, chest pain. No change in appetite. He does have a h/o of smoking. CT lung screening completed. He walks three miles a day at work. No dietary changes. No Known Allergies    Current Outpatient Medications   Medication Sig Dispense Refill    bisacodyl (BISACODYL) 5 MG EC tablet Take 4 tablets once for colonoscopy prep 4 tablet 0    pantoprazole (PROTONIX) 40 MG tablet TAKE 1 TABLET BY MOUTH EVERY DAY 30 tablet 5    metoprolol succinate (TOPROL XL) 50 MG extended release tablet TAKE 1 TABLET BY MOUTH DAILY 30 tablet 11    amLODIPine (NORVASC) 10 MG tablet Take 1 tablet by mouth daily 30 tablet 11    lisinopril (PRINIVIL;ZESTRIL) 20 MG tablet TAKE 1 TABLET BY MOUTH 2 TIMES DAILY 60 tablet 11    atorvastatin (LIPITOR) 80 MG tablet TAKE 1 TABLET BY MOUTH EVERY DAY 30 tablet 11    nicotine (NICODERM CQ) 14 MG/24HR Place 1 patch onto the skin every 24 hours      Multiple Vitamin (MULTI VITAMIN DAILY PO) Take by mouth      aspirin 81 MG tablet Take 81 mg by mouth daily. No current facility-administered medications for this visit.            Past Medical History:   Diagnosis Date    Chronic rhinitis     Cough     GERD (gastroesophageal reflux disease)     Hx of blood clots     Hypertension     Hypertrophy of prostate     Pleuritic chest pain     Pulmonary emboli (HCC)     Tachycardia      Past Surgical History:   Procedure Laterality Date    COLONOSCOPY  08/19/2016    10 small colon polyps, int hem    COLONOSCOPY  09/06/2017    hems grade 2    FINGER SURGERY      LITHOTRIPSY      VASECTOMY       Social History     Socioeconomic History    Marital status:      Spouse name: None    Number of children: None    Years of education: None    Highest education level: None   Occupational History    None   Social Needs    Financial resource strain: None    Food insecurity     Worry: None     Inability: None    Transportation needs     Medical: None     Non-medical: None   Tobacco Use    Smoking status: Current Some Day Smoker     Packs/day: 0.50     Years: 50.00     Pack years: 25.00     Types: Cigarettes    Smokeless tobacco: Never Used    Tobacco comment: Pt is not smoking at work only in evening. Substance and Sexual Activity    Alcohol use: Yes     Alcohol/week: 0.0 standard drinks     Comment: 4 beers day     Drug use: No    Sexual activity: Never   Lifestyle    Physical activity     Days per week: None     Minutes per session: None    Stress: None   Relationships    Social connections     Talks on phone: None     Gets together: None     Attends Oriental orthodox service: None     Active member of club or organization: None     Attends meetings of clubs or organizations: None     Relationship status: None    Intimate partner violence     Fear of current or ex partner: None     Emotionally abused: None     Physically abused: None     Forced sexual activity: None   Other Topics Concern    None   Social History Narrative    None         Tobacco abuse  Continuous to smoke. Not ready to quit. Lung nodule  Stable lung nodule on CT, with new 2-3 mm nodule. Discussed referral to specialist. Previously he saw Dr. Martin Resendiz and Dr. Alexia Moreno. He prefers to see Dr. Martin Resendiz if able    Essential hypertension  Stable. Patient denies any exertional chest pain, dyspnea, palpitations, syncope, orthopnea, edema or paroxysmal nocturnal dyspnea. Review of Systems   Constitutional: Negative for appetite change, chills, fatigue, fever and unexpected weight change. Respiratory: Negative for cough, chest tightness, shortness of breath and wheezing.     Cardiovascular: Negative for chest pain, palpitations and leg swelling. Gastrointestinal: Negative for abdominal pain, blood in stool, constipation, diarrhea, nausea and vomiting. Musculoskeletal: Negative for arthralgias. Neurological: Negative for weakness, numbness and headaches. Hematological: Negative for adenopathy. OBJECTIVE:    /78 (Site: Right Upper Arm, Position: Sitting, Cuff Size: Large Adult)   Pulse 102   Temp 97.1 °F (36.2 °C)   Resp 18   Ht 5' 9.5\" (1.765 m)   Wt 152 lb 4 oz (69.1 kg)   SpO2 96%   BMI 22.16 kg/m²     Physical Exam  Constitutional:       Appearance: Normal appearance. He is well-developed. HENT:      Head: Normocephalic and atraumatic. Right Ear: Hearing normal.      Left Ear: Hearing normal.   Neck:      Musculoskeletal: Normal range of motion and neck supple. Vascular: No carotid bruit or JVD. Cardiovascular:      Rate and Rhythm: Normal rate and regular rhythm. Heart sounds: Normal heart sounds. No murmur. No friction rub. No gallop. Pulmonary:      Effort: Pulmonary effort is normal. No respiratory distress. Breath sounds: Normal breath sounds. No wheezing, rhonchi or rales. Abdominal:      Palpations: Abdomen is soft. Musculoskeletal: Normal range of motion. Skin:     General: Skin is warm and dry. Neurological:      General: No focal deficit present. Mental Status: He is alert and oriented to person, place, and time. Psychiatric:         Mood and Affect: Mood normal.         Behavior: Behavior normal. Behavior is cooperative. Thought Content: Thought content normal.         ASSESSMENT/PLAN:    1. Lung nodule  Schedule with Dr. David Silva. May also need to see pulmonology   - Beatriz Higgins MD, Hemotology Oncology, [place]    2. Weight loss  Schedule with Dr. David Silva. - Beatriz Higgins MD, Hemotology Oncology, [place]    3. Tobacco abuse  Counseled on cessation    4.  Essential hypertension  Continue current medication           Return in about 4 months (around 2/28/2021).       (Please note that portions of this note may have been completed with a voice recognition program. Efforts were made to edit the dictations but occasionally words aremis-transcribed.)

## 2020-10-28 NOTE — ASSESSMENT & PLAN NOTE
Stable lung nodule on CT, with new 2-3 mm nodule. Discussed referral to specialist. Previously he saw Dr. Bhupendra Coats and Dr. Christine López.  He prefers to see Dr. Bhupendra Coats if able

## 2020-11-06 ENCOUNTER — HOSPITAL ENCOUNTER (OUTPATIENT)
Dept: INFUSION THERAPY | Age: 70
Discharge: HOME | End: 2020-11-06
Payer: COMMERCIAL

## 2020-11-06 ENCOUNTER — INITIAL CONSULT (OUTPATIENT)
Dept: ONCOLOGY | Age: 70
End: 2020-11-06
Payer: COMMERCIAL

## 2020-11-06 VITALS
TEMPERATURE: 97.9 F | WEIGHT: 153 LBS | HEART RATE: 90 BPM | RESPIRATION RATE: 16 BRPM | DIASTOLIC BLOOD PRESSURE: 74 MMHG | BODY MASS INDEX: 21.9 KG/M2 | HEIGHT: 70 IN | SYSTOLIC BLOOD PRESSURE: 122 MMHG | OXYGEN SATURATION: 95 %

## 2020-11-06 PROCEDURE — 99204 OFFICE O/P NEW MOD 45 MIN: CPT | Performed by: INTERNAL MEDICINE

## 2020-11-06 PROCEDURE — 99201 HC NEW PT, OUTPT VISIT LEVEL 1: CPT

## 2020-11-06 ASSESSMENT — PATIENT HEALTH QUESTIONNAIRE - PHQ9
SUM OF ALL RESPONSES TO PHQ9 QUESTIONS 1 & 2: 0
SUM OF ALL RESPONSES TO PHQ QUESTIONS 1-9: 0
1. LITTLE INTEREST OR PLEASURE IN DOING THINGS: 0
SUM OF ALL RESPONSES TO PHQ QUESTIONS 1-9: 0
SUM OF ALL RESPONSES TO PHQ QUESTIONS 1-9: 0
2. FEELING DOWN, DEPRESSED OR HOPELESS: 0

## 2020-11-06 NOTE — PROGRESS NOTES
MA Rooming Questions  Patient: Juan Acosta  MRN: [id number]    Date: 11/6/2020        1. Do you have any new issues? no     NP  2. Do you need any refills on medications?    no  5. Did the patient have a depression screening completed today?  Yes    No data recorded     PHQ-9 Given to (if applicable):               PHQ-9 Score (if applicable):                     [] Positive     []  Negative              Does question #9 need addressed (if applicable)                     [] Yes    []  No               Owen Collins MA

## 2020-11-06 NOTE — PROGRESS NOTES
half PPD and has been smoking >50 years. He drinks daily EtOH. He has 2 children. Family History:   No cancer in the family. No Known Allergies    Current Outpatient Medications on File Prior to Visit   Medication Sig Dispense Refill    pantoprazole (PROTONIX) 40 MG tablet TAKE 1 TABLET BY MOUTH EVERY DAY 30 tablet 5    metoprolol succinate (TOPROL XL) 50 MG extended release tablet TAKE 1 TABLET BY MOUTH DAILY 30 tablet 11    amLODIPine (NORVASC) 10 MG tablet Take 1 tablet by mouth daily 30 tablet 11    lisinopril (PRINIVIL;ZESTRIL) 20 MG tablet TAKE 1 TABLET BY MOUTH 2 TIMES DAILY 60 tablet 11    atorvastatin (LIPITOR) 80 MG tablet TAKE 1 TABLET BY MOUTH EVERY DAY 30 tablet 11    nicotine (NICODERM CQ) 14 MG/24HR Place 1 patch onto the skin every 24 hours      Multiple Vitamin (MULTI VITAMIN DAILY PO) Take by mouth      aspirin 81 MG tablet Take 81 mg by mouth daily. No current facility-administered medications on file prior to visit. Review of Systems:    Constitutional:  No weight loss, No fever, No chills, No night sweats. Energy level good. Eyes:  No diplopia, No transient or permanent loss of vision, No scotomata. ENT / Mouth:  No epistaxis, No dysphagia, No hoarseness, No oral ulcers, No gingival bleeding. No sore throat, No postnasal drip, No nasal drip, No mouth pain, No sinus pain, No tinnitus, Normal hearing. Cardiovascular:  No chest pain, No palpitations, No syncope, No upper extremity edema, No lower extremity edema, No calf discomfort. Respiratory:  No cough. No hemoptysis, No pleurisy, No wheezing, No dyspnea. Gastrointestinal:  No abdominal pain, No abdominal cramping, No nausea, No vomiting, No constipation, No diarrhea, No hematochezia, No melena, No jaundice, No dyspepsia, No dysphagia. Urinary:  No dysuria, No hematuria, No urinary incontinence. Musculoskeletal:  No muscle pain, No swollen joints, No joint redness, No bone pain, No spine tenderness.   Skin: No rash, No nodules, No pruritus, No lesions. Neurologic:  No confusion, No seizures, No syncope, No tremor, No speech change, No headache, No hiccups, No abnormal gait, No sensory changes, No weakness. Psychiatric:  No depression, No anxiety, Concentration normal.  Endocrine:  No polyuria, No polydipsia, No hot flashes, No thyroid symptoms. Hematologic:  No epistaxis, No gingival bleeding, No petechiae, No ecchymosis. Lymphatic:  No lymphadenopathy, No lymphedema. Allergy / Immunologic:  No eczema, No frequent mucous infections, No frequent respiratory infections, No recurrent urticarial, No frequent skin infections. Vital Signs: /74 (Site: Right Upper Arm, Position: Sitting, Cuff Size: Medium Adult)   Pulse 90   Temp 97.9 °F (36.6 °C) (Infrared)   Resp 16   Ht 5' 10\" (1.778 m)   Wt 153 lb (69.4 kg)   SpO2 95%   BMI 21.95 kg/m²      Physical Exam:  CONSTITUTIONAL: awake, alert, cooperative, no apparent distress   EYES: pupils equal, round and reactive to light, sclera clear and conjunctiva normal  ENT: Normocephalic, without obvious abnormality, atraumatic  NECK: supple, symmetrical, no jugular venous distension and no carotid bruits   HEMATOLOGIC/LYMPHATIC: no cervical, supraclavicular or axillary lymphadenopathy   LUNGS: no increased work of breathing and clear to auscultation   CARDIOVASCULAR: regular rate and rhythm, normal S1 and S2, no murmur noted  ABDOMEN: normal bowel sounds x 4, soft, non-distended, non-tender, no masses palpated, no hepatosplenomegaly   MUSCULOSKELETAL: full range of motion noted, tone is normal  NEUROLOGIC: awake, alert, oriented to name, place and time. Motor skills grossly intact. SKIN: Normal skin color, texture, turgor and no jaundice. appears intact   EXTREMITIES: no LE edema. No cyanosis.       Labs:  Hematology:  Lab Results   Component Value Date    WBC 7.5 09/30/2020    RBC 4.55 09/30/2020    HGB 14.4 09/30/2020    HCT 42.1 09/30/2020    MCV 92.5 09/30/2020    MCH 31.6 09/30/2020    MCHC 34.2 09/30/2020    RDW 14.0 09/30/2020     09/30/2020    MPV 9.0 09/30/2020    SEGSPCT 82.0 (H) 05/02/2016    EOSRELPCT 3.0 09/30/2020    BASOPCT 0.8 09/30/2020    LYMPHOPCT 21.9 09/30/2020    MONOPCT 10.9 09/30/2020    SEGSABS 9.8 05/02/2016    EOSABS 0.2 09/30/2020    BASOSABS 0.1 09/30/2020    LYMPHSABS 1.6 09/30/2020    MONOSABS 0.8 09/30/2020    DIFFTYPE AUTOMATED DIFFERENTIAL 05/02/2016     Lab Results   Component Value Date    ESR 49 (H) 04/07/2015     Chemistry:  Lab Results   Component Value Date     09/30/2020    K 4.3 09/30/2020     09/30/2020    CO2 26 09/30/2020    BUN 14 09/30/2020    CREATININE 0.7 (L) 09/30/2020    GLUCOSE 89 09/30/2020    CALCIUM 9.1 09/30/2020    PROT 7.1 09/30/2020    LABALBU 4.1 09/30/2020    BILITOT 0.3 09/30/2020    ALKPHOS 79 09/30/2020    AST 28 09/30/2020    ALT 25 09/30/2020    LABGLOM >60 09/30/2020    GFRAA >60 09/30/2020    AGRATIO 1.4 09/30/2020    GLOB 3.0 09/30/2020     Lab Results   Component Value Date    HOMOCYSTEINE 4.6 04/09/2015     Lab Results   Component Value Date    TSHHS 1.020 05/02/2016    T4FREE 1.23 04/07/2015     Immunology:  Lab Results   Component Value Date    PROT 7.1 09/30/2020     Coagulation Panel:  Lab Results   Component Value Date    PROTIME 23.6 11/16/2016    INR 2.1 04/17/2017    APTT 32.2 04/09/2015    DDIMER 420 (H) 06/02/2017     Tumor Markers:  Lab Results   Component Value Date    PSA 0.66 09/30/2020     Observations:  PHQ-9 Total Score: 0 (11/6/2020  2:18 PM)     Assessment & Plan:    1. He has weight loss ~ 8 lbs over 6 months, likely due to eating habit. I advised to eat frequent meals at home. Reportedly weight is stable at this time. We discussed about smoking cessation. He otherwise feels good. 2. He has history of lung nodule and long standing smoking history. He will follow up with Dr Queenie Peralta. I consider follow up CT chest in 6 months.     3. He will see surgeon at [facility] for colonoscopy and I recommend to have EGD also. RTC in 6 weeks or sooner. All of his questions have been answered for today. I have discussed the above stated plan with the patient and they verbalized understanding and agreed with the plan. Thank you for allowing us to participate in this patient's care.       Electronically signed by Eve Hernandez MD on 11/6/20 at 8:22 AM EST

## 2020-12-08 ENCOUNTER — TELEPHONE (OUTPATIENT)
Dept: GASTROENTEROLOGY | Age: 70
End: 2020-12-08

## 2020-12-17 ENCOUNTER — TELEPHONE (OUTPATIENT)
Dept: GASTROENTEROLOGY | Age: 70
End: 2020-12-17

## 2020-12-17 NOTE — TELEPHONE ENCOUNTER
Patient with weight loss and long term acid reflux. Will order EGD to rule out abnormalities. Please schedule EGD with upcoming colonoscopy. Thank you.

## 2020-12-24 ENCOUNTER — TELEPHONE (OUTPATIENT)
Dept: GASTROENTEROLOGY | Age: 70
End: 2020-12-24

## 2020-12-24 NOTE — TELEPHONE ENCOUNTER
LEFT MESSAGE FOR Dennis Merino REGARDING SURGERY (EGD/CSCOPE) SCHEDULED @ [facility].  NOTIFIED OF DATES, TIMES AND LOCATION    PAT - PHONE  COVID - 1/11/21 @ 4:30  SURGERY - 1/18/21 @ 7:30  P/O - 1/25/21 @ 3    NPO AFTER MIDNIGHT  (MIRALAX/DULCOLAX)  HOLD BLOOD THINNERS - N/A   SENT

## 2020-12-30 ENCOUNTER — TELEPHONE (OUTPATIENT)
Dept: PULMONOLOGY | Age: 70
End: 2020-12-30

## 2020-12-30 NOTE — TELEPHONE ENCOUNTER
Per my call to [facility] at [facility]; No PA necessary for EGD/C-scope. WP#[id number]. Elig and benefits has been scanned into [employer].

## 2021-01-11 ENCOUNTER — HOSPITAL ENCOUNTER (OUTPATIENT)
Age: 71
Discharge: HOME | End: 2021-01-11
Payer: COMMERCIAL

## 2021-01-11 PROCEDURE — U0002 COVID-19 LAB TEST NON-CDC: HCPCS

## 2021-01-11 PROCEDURE — C9803 HOPD COVID-19 SPEC COLLECT: HCPCS

## 2021-01-13 LAB
SARS-COV-2: NOT DETECTED
SOURCE: NORMAL

## 2021-01-15 NOTE — PROGRESS NOTES
Provided pt with needed time of arrival, bring ID and Insurance card, leave valuables and jewelry at home, NPO after midnite and SDS phone # to call with any questions.

## 2021-01-18 ENCOUNTER — HOSPITAL ENCOUNTER (OUTPATIENT)
Age: 71
Setting detail: OUTPATIENT SURGERY
Discharge: HOME | End: 2021-01-18
Attending: SURGERY | Admitting: SURGERY
Payer: COMMERCIAL

## 2021-01-18 ENCOUNTER — ANESTHESIA (OUTPATIENT)
Dept: OPERATING ROOM | Age: 71
End: 2021-01-18
Payer: COMMERCIAL

## 2021-01-18 ENCOUNTER — ANESTHESIA EVENT (OUTPATIENT)
Dept: OPERATING ROOM | Age: 71
End: 2021-01-18
Payer: COMMERCIAL

## 2021-01-18 VITALS
BODY MASS INDEX: 21.47 KG/M2 | SYSTOLIC BLOOD PRESSURE: 106 MMHG | HEIGHT: 70 IN | TEMPERATURE: 97 F | DIASTOLIC BLOOD PRESSURE: 67 MMHG | RESPIRATION RATE: 16 BRPM | OXYGEN SATURATION: 95 % | WEIGHT: 150 LBS | HEART RATE: 79 BPM

## 2021-01-18 VITALS
DIASTOLIC BLOOD PRESSURE: 63 MMHG | OXYGEN SATURATION: 99 % | RESPIRATION RATE: 42 BRPM | SYSTOLIC BLOOD PRESSURE: 92 MMHG

## 2021-01-18 PROCEDURE — 2580000003 HC RX 258: Performed by: NURSE PRACTITIONER

## 2021-01-18 PROCEDURE — 3609010600 HC COLONOSCOPY POLYPECTOMY SNARE/COLD BIOPSY: Performed by: SURGERY

## 2021-01-18 PROCEDURE — 43239 EGD BIOPSY SINGLE/MULTIPLE: CPT | Performed by: SURGERY

## 2021-01-18 PROCEDURE — 2500000003 HC RX 250 WO HCPCS: Performed by: NURSE ANESTHETIST, CERTIFIED REGISTERED

## 2021-01-18 PROCEDURE — 88342 IMHCHEM/IMCYTCHM 1ST ANTB: CPT | Performed by: PATHOLOGY

## 2021-01-18 PROCEDURE — 3700000000 HC ANESTHESIA ATTENDED CARE: Performed by: SURGERY

## 2021-01-18 PROCEDURE — 6360000002 HC RX W HCPCS: Performed by: NURSE ANESTHETIST, CERTIFIED REGISTERED

## 2021-01-18 PROCEDURE — 45380 COLONOSCOPY AND BIOPSY: CPT | Performed by: SURGERY

## 2021-01-18 PROCEDURE — 3700000001 HC ADD 15 MINUTES (ANESTHESIA): Performed by: SURGERY

## 2021-01-18 PROCEDURE — 88341 IMHCHEM/IMCYTCHM EA ADD ANTB: CPT | Performed by: PATHOLOGY

## 2021-01-18 PROCEDURE — 7100000011 HC PHASE II RECOVERY - ADDTL 15 MIN: Performed by: SURGERY

## 2021-01-18 PROCEDURE — 7100000010 HC PHASE II RECOVERY - FIRST 15 MIN: Performed by: SURGERY

## 2021-01-18 PROCEDURE — 45385 COLONOSCOPY W/LESION REMOVAL: CPT | Performed by: SURGERY

## 2021-01-18 PROCEDURE — 88305 TISSUE EXAM BY PATHOLOGIST: CPT | Performed by: PATHOLOGY

## 2021-01-18 PROCEDURE — 2709999900 HC NON-CHARGEABLE SUPPLY: Performed by: SURGERY

## 2021-01-18 PROCEDURE — 3609012400 HC EGD TRANSORAL BIOPSY SINGLE/MULTIPLE: Performed by: SURGERY

## 2021-01-18 RX ORDER — SODIUM CHLORIDE, SODIUM LACTATE, POTASSIUM CHLORIDE, CALCIUM CHLORIDE 600; 310; 30; 20 MG/100ML; MG/100ML; MG/100ML; MG/100ML
500 INJECTION, SOLUTION INTRAVENOUS CONTINUOUS
Status: DISCONTINUED | OUTPATIENT
Start: 2021-01-18 | End: 2021-01-18 | Stop reason: HOSPADM

## 2021-01-18 RX ORDER — SODIUM CHLORIDE 0.9 % (FLUSH) 0.9 %
10 SYRINGE (ML) INJECTION EVERY 12 HOURS SCHEDULED
Status: DISCONTINUED | OUTPATIENT
Start: 2021-01-18 | End: 2021-01-18 | Stop reason: HOSPADM

## 2021-01-18 RX ORDER — SODIUM CHLORIDE 0.9 % (FLUSH) 0.9 %
10 SYRINGE (ML) INJECTION PRN
Status: DISCONTINUED | OUTPATIENT
Start: 2021-01-18 | End: 2021-01-18 | Stop reason: HOSPADM

## 2021-01-18 RX ORDER — PROPOFOL 10 MG/ML
INJECTION, EMULSION INTRAVENOUS PRN
Status: DISCONTINUED | OUTPATIENT
Start: 2021-01-18 | End: 2021-01-18 | Stop reason: SDUPTHER

## 2021-01-18 RX ORDER — LIDOCAINE HYDROCHLORIDE 20 MG/ML
INJECTION, SOLUTION EPIDURAL; INFILTRATION; INTRACAUDAL; PERINEURAL PRN
Status: DISCONTINUED | OUTPATIENT
Start: 2021-01-18 | End: 2021-01-18 | Stop reason: SDUPTHER

## 2021-01-18 RX ADMIN — PROPOFOL 470 MG: 10 INJECTION, EMULSION INTRAVENOUS at 09:07

## 2021-01-18 RX ADMIN — SODIUM CHLORIDE, POTASSIUM CHLORIDE, SODIUM LACTATE AND CALCIUM CHLORIDE 1000 ML: 600; 310; 30; 20 INJECTION, SOLUTION INTRAVENOUS at 08:07

## 2021-01-18 RX ADMIN — SODIUM CHLORIDE, PRESERVATIVE FREE 10 ML: 5 INJECTION INTRAVENOUS at 08:07

## 2021-01-18 RX ADMIN — LIDOCAINE HYDROCHLORIDE 100 MG: 20 INJECTION, SOLUTION EPIDURAL; INFILTRATION; INTRACAUDAL; PERINEURAL at 09:07

## 2021-01-18 ASSESSMENT — PAIN SCALES - GENERAL: PAINLEVEL_OUTOF10: 0

## 2021-01-18 ASSESSMENT — PAIN - FUNCTIONAL ASSESSMENT: PAIN_FUNCTIONAL_ASSESSMENT: 0-10

## 2021-01-18 ASSESSMENT — LIFESTYLE VARIABLES: SMOKING_STATUS: 1

## 2021-01-18 NOTE — H&P
History and Physical Exam  Dr. Janet Nicole  Chief Complaint:  Neeed  For screening colonoscopy, unexplained weight loss  HPI   Sarai Clark is a 65 y.o. male with a history of colon polyps removed in 2016 then normal colonoscopy in 2017. He denies changes in his bowel habits. No blood in the stools or melena. He has lost ~9 lbs in 9 months without changing his routine other than eating out less. He does have a history of reflux which is controlled with protonix. No family history of colon cancer. Denies other complaints.       Past Medical History:   Diagnosis Date    Chronic rhinitis     COPD (chronic obstructive pulmonary disease) (HCC)     Cough     GERD (gastroesophageal reflux disease)     Hx of blood clots     Hyperlipidemia     Hypertension     Hypertrophy of prostate     Pleuritic chest pain     Pulmonary emboli (HCC)     Tachycardia      Patient Active Problem List    Diagnosis Date Noted    Insomnia 06/02/2017    History of pulmonary embolism 06/02/2017    Heme positive stool     Positive FIT (fecal immunochemical test) 06/29/2016    Mixed hyperlipidemia 05/19/2016    Pulmonary embolism ([facility] [address] 05/11/2016    Essential hypertension 04/07/2016    Gastroesophageal reflux disease without esophagitis 04/07/2016    Tobacco abuse 04/09/2015    Lung nodule 04/09/2015     Past Surgical History:   Procedure Laterality Date    COLONOSCOPY  08/19/2016    10 small colon polyps, int hem    COLONOSCOPY  09/06/2017    hems grade 2    FINGER SURGERY      LITHOTRIPSY      VASECTOMY       Family History   Problem Relation Age of Onset    Other Mother         \"D fib\"    COPD Father      Social History     Socioeconomic History    Marital status:      Spouse name: Not on file    Number of children: Not on file    Years of education: Not on file    Highest education level: Not on file   Occupational History    Not on file   Social Needs  Financial resource strain: Not on file   Dallin-Beny insecurity     Worry: Not on file     Inability: Not on file   [employer] needs     Medical: Not on file     Non-medical: Not on file   Tobacco Use    Smoking status: Current Some Day Smoker     Packs/day: 0.50     Years: 50.00     Pack years: 25.00     Types: Cigarettes    Smokeless tobacco: Never Used    Tobacco comment: Pt is not smoking at work only in evening. Substance and Sexual Activity    Alcohol use: Yes     Alcohol/week: 4.0 standard drinks     Types: 4 Cans of beer per week     Comment: 4 beers day     Drug use: Not Currently    Sexual activity: Yes     Partners: Female   Lifestyle    Physical activity     Days per week: Not on file     Minutes per session: Not on file    Stress: Not on file   Relationships    Social connections     Talks on phone: Not on file     Gets together: Not on file     Attends Jainism service: Not on file     Active member of club or organization: Not on file     Attends meetings of clubs or organizations: Not on file     Relationship status: Not on file    Intimate partner violence     Fear of current or ex partner: Not on file     Emotionally abused: Not on file     Physically abused: Not on file     Forced sexual activity: Not on file   Other Topics Concern    Not on file   Social History Narrative    Not on file     Current Facility-Administered Medications   Medication Dose Route Frequency Provider Last Rate Last Admin    lactated ringers infusion 500 mL  500 mL Intravenous Continuous ANGIE Jean-Baptiste CNP 75 mL/hr at 01/18/21 0807 1,000 mL at 01/18/21 0807    sodium chloride flush 0.9 % injection 10 mL  10 mL Intravenous 2 times per day ANGIE Jean-Baptiste CNP        sodium chloride flush 0.9 % injection 10 mL  10 mL Intravenous PRN ANGIE Jean-Baptiste CNP   10 mL at 01/18/21 9613     No current facility-administered medications on file prior to encounter. Current Outpatient Medications on File Prior to Encounter   Medication Sig Dispense Refill    pantoprazole (PROTONIX) 40 MG tablet TAKE 1 TABLET BY MOUTH EVERY DAY 30 tablet 5    metoprolol succinate (TOPROL XL) 50 MG extended release tablet TAKE 1 TABLET BY MOUTH DAILY 30 tablet 11    amLODIPine (NORVASC) 10 MG tablet Take 1 tablet by mouth daily 30 tablet 11    lisinopril (PRINIVIL;ZESTRIL) 20 MG tablet TAKE 1 TABLET BY MOUTH 2 TIMES DAILY 60 tablet 11    atorvastatin (LIPITOR) 80 MG tablet TAKE 1 TABLET BY MOUTH EVERY DAY 30 tablet 11    nicotine (NICODERM CQ) 14 MG/24HR Place 1 patch onto the skin every 24 hours      Multiple Vitamin (MULTI VITAMIN DAILY PO) Take by mouth      aspirin 81 MG tablet Take 81 mg by mouth daily. No Known Allergies  Patient'smedications, allergies, past medical, surgical, social and family histories were reviewed and updated as appropriate. Review of Systems  Constitutional: Negative for chills. Negative for fever. Positive for weight loss  HENT: Negative for congestion. Negative for rhinorrhea. Respiratory: Negative for cough. Negative for shortness of breath. Negative for wheezing. Positive for tobacco use. Cardiovascular: Negative for chest pain. Gastrointestinal: History of colon polyps as per HPI. Negative for constipation. Negative for diarrhea. Negative for nausea and vomiting. Genitourinary: Negative for difficulty urinating. Neurological: Negative for dizziness, syncope and numbness. Hematological: Does not bruise/bleed easily.         Objective:      /75   Pulse 98   Temp 97.4 °F (36.3 °C) (Temporal)   Resp 18   Ht 5' 9.5\" (1.765 m)   Wt 150 lb (68 kg)   SpO2 97%   BMI 21.83 kg/m²     Physical Exam  General: awake, alert, in no acute distress  HEENT: mucous membranes moist  Respiratory: normal effort, no wheezes appreciated  CV: appears well perfused, regular rate and rhythm Abdomen: Soft, non-tender, non-distended. No guarding or rebound tenderness. Skin: warm and dry  Extremities: atraumatic  Neuro: no focal deficits noted  Psych: mood normal      DATA:    CBC:   Lab Results   Component Value Date    WBC 7.5 09/30/2020    RBC 4.55 09/30/2020    HGB 14.4 09/30/2020    HCT 42.1 09/30/2020    MCV 92.5 09/30/2020    MCH 31.6 09/30/2020    MCHC 34.2 09/30/2020    RDW 14.0 09/30/2020     09/30/2020    MPV 9.0 09/30/2020     BMP:    Lab Results   Component Value Date     09/30/2020    K 4.3 09/30/2020     09/30/2020    CO2 26 09/30/2020    BUN 14 09/30/2020    LABALBU 4.1 09/30/2020    CREATININE 0.7 09/30/2020    CALCIUM 9.1 09/30/2020    GFRAA >60 09/30/2020    LABGLOM >60 09/30/2020    GLUCOSE 89 09/30/2020       Assessment:       65 y.o. male with need for colonoscopy and EGD           Plan:          Plan for colonoscopy and egd    Risks and benefits of the procedure were discussed including risk of bleeding, bowel perforation and missing a lesion that could cause her problems in the future. The patient voiced understanding these risks and wishes to proceed with colonoscopy and EGD. Electronically signed by Leslie Borja MD on 1/18/2021 at 8:55 AM    The patient was counseled at length about the risks of emelia Covid-19 during their perioperative period and any recovery window from their procedure. The patient was made aware that emelia Covid-19  may worsen their prognosis for recovering from their procedure  and lend to a higher morbidity and/or mortality risk. All material risks, benefits, and reasonable alternatives including postponing the procedure were discussed. The patient does wish to proceed with the procedure at this time.

## 2021-01-18 NOTE — PROGRESS NOTES
Patient is awake, alert, and oriented x 4. On room air. Vs stable. Pt. Denies pain or nausea. Pt. Declined anything to drink/ice chips. Call light in reach. Dr. Suárez Stubbs in to talk with pt. Will continue to monitor.

## 2021-01-18 NOTE — ANESTHESIA PRE PROCEDURE
Department of Anesthesiology  Preprocedure Note       Name:  Nate Frausto   Age:  65 y.o.  :  1950                                          MRN:  [id number]         Date:  2021      Surgeon: Zuleyma Ochoa):  Evelyn Pulliam MD    Procedure: Procedure(s):  COLONOSCOPY DIAGNOSTIC  EGD ESOPHAGOGASTRODUODENOSCOPY    Medications prior to admission:   Prior to Admission medications    Medication Sig Start Date End Date Taking? Authorizing Provider   pantoprazole (PROTONIX) 40 MG tablet TAKE 1 TABLET BY MOUTH EVERY DAY 20  Yes ANGIE Valverde CNP   metoprolol succinate (TOPROL XL) 50 MG extended release tablet TAKE 1 TABLET BY MOUTH DAILY 20  Yes ANGIE Valverde CNP   amLODIPine (NORVASC) 10 MG tablet Take 1 tablet by mouth daily 20  Yes ANGIE Valverde CNP   lisinopril (PRINIVIL;ZESTRIL) 20 MG tablet TAKE 1 TABLET BY MOUTH 2 TIMES DAILY 3/26/20  Yes ANGIE Valverde CNP   atorvastatin (LIPITOR) 80 MG tablet TAKE 1 TABLET BY MOUTH EVERY DAY 20  Yes ANGIE Valverde CNP   nicotine (NICODERM CQ) 14 MG/24HR Place 1 patch onto the skin every 24 hours   Yes Historical Provider, MD   Multiple Vitamin (MULTI VITAMIN DAILY PO) Take by mouth   Yes Historical Provider, MD   aspirin 81 MG tablet Take 81 mg by mouth daily.    Yes Historical Provider, MD       Current medications:    Current Facility-Administered Medications   Medication Dose Route Frequency Provider Last Rate Last Admin    lactated ringers infusion 500 mL  500 mL Intravenous Continuous ANGIE Parish CNP 75 mL/hr at 21 0807 1,000 mL at 21 0807    sodium chloride flush 0.9 % injection 10 mL  10 mL Intravenous 2 times per day ANGIE Parish CNP        sodium chloride flush 0.9 % injection 10 mL  10 mL Intravenous PRN ANGIE Parish CNP   10 mL at 21 1104       Allergies:  No Known Allergies    Problem List:    Patient Active Problem List   Diagnosis Code  Tobacco abuse Z72.0    Lung nodule R91.1    Essential hypertension I10    Gastroesophageal reflux disease without esophagitis K21.9    Pulmonary embolism (HCC) I26.99    Mixed hyperlipidemia E78.2    Positive FIT (fecal immunochemical test) R19.5    Heme positive stool R19.5    Insomnia G47.00    History of pulmonary embolism Z86.711       Past Medical History:        Diagnosis Date    Chronic rhinitis     COPD (chronic obstructive pulmonary disease) (HCC)     Cough     GERD (gastroesophageal reflux disease)     Hx of blood clots     Hyperlipidemia     Hypertension     Hypertrophy of prostate     Pleuritic chest pain     Pulmonary emboli (HCC)     Tachycardia        Past Surgical History:        Procedure Laterality Date    COLONOSCOPY  08/19/2016    10 small colon polyps, int hem    COLONOSCOPY  09/06/2017    hems grade 2    FINGER SURGERY      LITHOTRIPSY      VASECTOMY         Social History:    Social History     Tobacco Use    Smoking status: Current Some Day Smoker     Packs/day: 0.50     Years: 50.00     Pack years: 25.00     Types: Cigarettes    Smokeless tobacco: Never Used    Tobacco comment: Pt is not smoking at work only in evening. Substance Use Topics    Alcohol use: Yes     Alcohol/week: 4.0 standard drinks     Types: 4 Cans of beer per week     Comment: 4 beers day                                 Ready to quit: Not Answered  Counseling given: Not Answered  Comment: Pt is not smoking at work only in evening.       Vital Signs (Current):   Vitals:    01/15/21 1536 01/18/21 0753   BP:  111/75   Pulse:  98   Resp:  18   Temp:  36.3 °C (97.4 °F)   TempSrc:  Temporal   SpO2:  97%   Weight: 150 lb (68 kg)    Height: 5' 9.5\" (1.765 m)                                               BP Readings from Last 3 Encounters:   01/18/21 111/75   11/06/20 122/74   10/28/20 112/78       NPO Status: Time of last liquid consumption: 0200(prep and water) Time of last solid consumption: 1900                        Date of last liquid consumption: 01/17/21                        Date of last solid food consumption: 01/17/21    BMI:   Wt Readings from Last 3 Encounters:   01/15/21 150 lb (68 kg)   11/06/20 153 lb (69.4 kg)   10/28/20 152 lb 4 oz (69.1 kg)     Body mass index is 21.83 kg/m². CBC:   Lab Results   Component Value Date    WBC 7.5 09/30/2020    RBC 4.55 09/30/2020    HGB 14.4 09/30/2020    HCT 42.1 09/30/2020    MCV 92.5 09/30/2020    RDW 14.0 09/30/2020     09/30/2020       CMP:   Lab Results   Component Value Date     09/30/2020    K 4.3 09/30/2020     09/30/2020    CO2 26 09/30/2020    BUN 14 09/30/2020    CREATININE 0.7 09/30/2020    GFRAA >60 09/30/2020    AGRATIO 1.4 09/30/2020    LABGLOM >60 09/30/2020    GLUCOSE 89 09/30/2020    PROT 7.1 09/30/2020    CALCIUM 9.1 09/30/2020    BILITOT 0.3 09/30/2020    ALKPHOS 79 09/30/2020    AST 28 09/30/2020    ALT 25 09/30/2020       POC Tests: No results for input(s): POCGLU, POCNA, POCK, POCCL, POCBUN, POCHEMO, POCHCT in the last 72 hours.     Coags:   Lab Results   Component Value Date    PROTIME 23.6 11/16/2016    INR 2.1 04/17/2017    INR 2.79 04/18/2015    APTT 32.2 04/09/2015       HCG (If Applicable): No results found for: PREGTESTUR, PREGSERUM, HCG, HCGQUANT     ABGs: No results found for: PHART, PO2ART, TMD9ZTC, DXK7YGR, BEART, W2CMDPYO     Type & Screen (If Applicable):  No results found for: LABABO, LABRH    Drug/Infectious Status (If Applicable):  No results found for: HIV, HEPCAB    COVID-19 Screening (If Applicable):   Lab Results   Component Value Date    COVID19 NOT DETECTED 01/11/2021         Anesthesia Evaluation  Patient summary reviewed and Nursing notes reviewed no history of anesthetic complications:   Airway: Mallampati: I  TM distance: >3 FB   Neck ROM: full  Mouth opening: > = 3 FB Dental:      Comment: Denies loose/chipped/cracked teeth Pulmonary: breath sounds clear to auscultation  (+) COPD:  current smoker                          ROS comment: Lung nodule  Cough   Cardiovascular:  Exercise tolerance: poor (<4 METS),   (+) hypertension:, hyperlipidemia      ECG reviewed  Rhythm: regular  Rate: normal           Beta Blocker:  Dose within 24 Hrs      ROS comment: Sinus  Rhythm   WITHIN NORMAL LIMITS     Neuro/Psych:                ROS comment: Insomnia GI/Hepatic/Renal:   (+) GERD:, bowel prep,           Endo/Other:                     Abdominal:           Vascular:   + PE. Anesthesia Plan      general     ASA 3     (COVID negative 01/11/2021)  Induction: intravenous. Anesthetic plan and risks discussed with patient. Plan discussed with CRNA.               ANGIE Uribe - CRNA   1/18/2021

## 2021-01-18 NOTE — PROCEDURES
PROCEDURE NOTE    DATE OF PROCEDURE: 1/18/2021    SURGEON: Rogelio Guevara M.D.    ASSISTANT: None    PREOPERATIVE DIAGNOSIS: history of colon polyps, unexplained weight loss    POSTOPERATIVE DIAGNOSIS:   1. 7mm splenic flexure polyp, removed with cold snare  2. 4mm rectal polyp, removed with cold forcep    OPERATION: Total colonoscopy with polypectomies    ANESTHESIA: Local monitored anesthesia. ESTIMATED BLOOD LOSS: None. COMPLICATIONS: None. SPECIMENS: were obtained    HISTORY: The patient is a 65y.o. year old male with history of above preop diagnosis. I recommended colonoscopy with possible biopsy or polypectomy and I explained the risk, benefits, expected outcome, and alternatives to the procedure. Risks included but are not limited to bleeding, infection, respiratory distress, hypotension, and perforation of the colon. The patient understands and was in agreement. PROCEDURE: The patient was given sedation per anesthesia. The patient was given oxygen by nasal cannula. The patient's SPO2 remained appropriate throughout the procedure. The colonoscope was inserted per rectum and advanced under direct vision to the cecum without difficulty. Findings:  Cecum/Ascending colon: normal    Transverse colon: 7mm pedunculated splenic flexure polyp removed with cold snare    Descending/Sigmoid colon: normal    Rectum/Anus: examined in normal and retroflexed positions and was 4mm distal rectal polyp removed with cold forcep. Grade I internal hemorrhoids seen on retroflexion. The colon was decompressed and the scope was removed. The patient tolerated the procedure well. IMPRESSION/PLAN:   1. Polyps- will follow pathology. Recommended repeat screening colonoscopy timeframe based on path. 2. Internal hemorrhoids. Recommend high fiber diet or taking a fiber supplement.     Electronically signed by Rogelio Guevara MD on 1/18/2021 at 10:08 AM
Descending: normal    Bulb: 2 small ~2-3mm polyps with normal appearing overlying mucosa. Biopsied with cold forcep. Stomach:    Antrum: moderate gastritis and evidence of several small healed ulcers. Biopsies taken for pathology and H.pylori testing    Body: thickened gastric folds    Fundus: thickened gastric folds    Esophagus: small hiatal hernia and mild esophagitis. Biopsies taken to rule out short segment Cobian's    Larynx: normal    The stomach was desufflated and the endoscope was removed. The patient tolerated the procedure well, and was transferred to the PACU following the procedure in stable condition. IMPRESSION/PLAN:   1. Duodenal polyps-likely hyperplastic. Will follow pathology  2. Gastritis/evidence of prior ulcer disease/esophagitis. Follow up biopsies. Continue Protonix for now. Will consider adjusting dose/reflux medication once pathology is back. 3. Hiatal hernia--small hernia present, is not symptomatic at this time and no need for intervention.     Electronically signed by Jarad Prabhakar MD on 1/18/2021 at 10:00 AM

## 2021-01-18 NOTE — ANESTHESIA POSTPROCEDURE EVALUATION
Department of Anesthesiology  Postprocedure Note    Patient: Meaghan Leach  MRN: [id number]  YOB: 1950  Date of evaluation: 1/18/2021  Time:  10:38 AM     Procedure Summary     Date: 01/18/21 Room / Location: [address] [place] [address] 01 / [facility]    Anesthesia Start: 0900 Anesthesia Stop: 1006    Procedures:       COLONOSCOPY POLYPECTOMY SNARE/COLD BIOPSY (N/A )      EGD BIOPSY (N/A ) Diagnosis: (HX OF COLON POLYPS, WEIGHT LOSS NOT INTENTIONAL)    Surgeons: Eric Duval MD Responsible Provider: ANGIE Givens CRNA    Anesthesia Type: general ASA Status: 3          Anesthesia Type: general    Demario Phase I:      Demario Phase II: Demario Score: 10    Last vitals: Reviewed and per EMR flowsheets.        Anesthesia Post Evaluation    Patient location during evaluation: bedside  Patient participation: complete - patient participated  Level of consciousness: awake and alert  Pain score: 0  Airway patency: patent  Nausea & Vomiting: no nausea and no vomiting  Complications: no  Cardiovascular status: blood pressure returned to baseline and hemodynamically stable  Respiratory status: acceptable, room air and spontaneous ventilation  Hydration status: euvolemic

## 2021-01-22 ENCOUNTER — TELEPHONE (OUTPATIENT)
Dept: SURGERY | Age: 71
End: 2021-01-22

## 2021-01-22 NOTE — TELEPHONE ENCOUNTER
I called Angel Pimentel and briefly discussed his pathology. Path:  Final Pathologic Diagnosis:   A. Duodenum, first portion, \"polyp\", biopsy:        -     Duodenal mucosa with foveolar metaplasia and   Brunner's glands hyperplasia, suggestive of peptic injury. B. Stomach, antrum, biopsy:   -     Mild chronic gastritis. -     Focal intestinal metaplasia, negative for dysplasia. -     No definitive Helicobacter organisms are identified   (H.pylori immunostain examined). -     Chromogranin immunostain shows mild neuroendocrine   hyperplasia. C. Esophagus, biopsy:   -     Intestinal metaplasia, negative for dysplasia. -     Cardiac mucosa with mild chronic inflammation. -     Squamous mucosa with mild reactive changes. D. Colon, 7 mm splenic flexure polyp, biopsy:   -     Tubulovillous adenoma. E. Rectum, polyp, biopsy:   -     Hyperplastic polyp. I recommended he continue his omeprazole. He had Cobian's \"intestinal metaplasia\" in the esophagus-no displasia thankfully   --recommend 3-5 year repeat EGD for Cobian's  --recommend 5 year repeat Colonoscopy given the tubulovillous adenoma at the splenic flexure.   --I encouraged him to keep his appointment with Kathleen Isidro for further recommendations    Electronically signed by Rosalie Pearson MD on 1/22/2021 at 9:39 AM

## 2021-02-02 ENCOUNTER — VIRTUAL VISIT (OUTPATIENT)
Dept: GASTROENTEROLOGY | Age: 71
End: 2021-02-02

## 2021-02-02 DIAGNOSIS — K22.70 BARRETT'S ESOPHAGUS WITHOUT DYSPLASIA: Primary | ICD-10-CM

## 2021-02-02 DIAGNOSIS — K21.9 GASTROESOPHAGEAL REFLUX DISEASE WITHOUT ESOPHAGITIS: ICD-10-CM

## 2021-02-02 DIAGNOSIS — K31.A0 INTESTINAL METAPLASIA OF GASTRIC MUCOSA: ICD-10-CM

## 2021-02-02 DIAGNOSIS — D36.9 ADENOMATOUS POLYPS: ICD-10-CM

## 2021-02-02 PROCEDURE — 99442 PR PHYS/QHP TELEPHONE EVALUATION 11-20 MIN: CPT | Performed by: NURSE PRACTITIONER

## 2021-02-02 RX ORDER — PANTOPRAZOLE SODIUM 40 MG/1
TABLET, DELAYED RELEASE ORAL
Qty: 30 TABLET | Refills: 5 | Status: SHIPPED | OUTPATIENT
Start: 2021-02-02 | End: 2021-03-03 | Stop reason: SDUPTHER

## 2021-02-02 NOTE — PROGRESS NOTES
Marixa Salinas is a 65 y.o. male evaluated via telephone on 2/2/2021. Consent:  He is aware that that he may receive a bill for this telephone service, depending on his insurance coverage, and has provided verbal consent to proceed: Yes he agreed to the telephone visit. Documentation:  I communicated with the patient about follow-up on EGD/colonoscopy. His EGD revealed several small polyps in the duodenum, small healed ulcers in stomach antrum, moderate gastritis with thickening of stomach folds, small hiatal hernia and mild esophagitis. His duodenal polyp biopsies showed duodenal mucosa with Brunner's gland hyperplasia. His stomach biopsies revealed intestinal metaplasia without evidence of dysplasia. No evidence of H. Pylori infection. His esophageal biopsies showed mild chronic inflammation with Cobian's esophagus that was negative for dysplasia. His colonoscopy showed two colon polyps that were removed; one was noted to be a tubulovillous adenoma. He reports feeling good. His bowel pattern has normalized. His typical bowel pattern is daily with soft brown formed stools. No diarrhea or constipation. No blood in his stools or black tarry stools. No excess belching or flatulence. His appetite is good and weight is stable. No nausea or vomiting. His heartburn and acid reflux is controlled with Protonix. No nocturnal awakenings with acid reflux. No dysphagia or pain with swallowing. No abdominal pain, bloating or distention. No family history of stomach or colon cancer. ROS  Review of Systems   Constitutional: Positive for fatigue. Negative for appetite change, chills, diaphoresis, fever and unexpected weight change. HENT: Negative for ear pain, hearing loss and tinnitus. Eyes: Positive for visual disturbance. Negative for pain and discharge. Respiratory: Negative for cough, shortness of breath and wheezing. 3.  Gastric intestinal metaplasia with healed gastric ulcersmost likely from long term acid reflux. His EGD showed no evidence of H. Pylori infection. Recommend repeat EGD in 2-3 years. Recommend avoidance of NSAIDs and alcohol. 4.  Tubulovillous colon polyp that was removed would recommend repeat colonoscopy in five years for colorectal cancer surveillance. 5.  The patient was encouraged to follow-up in 3 months.         I affirm this is a Patient Initiated Episode with a Patient who has not had a related appointment within my department in the past 7 days or scheduled within the next 24 hours. Patient identification was verified at the start of the visit: Yes he verified his full name and date of birth. Total Time: 15 minutes.     Note: not billable if this call serves to triage the patient into an appointment for the relevant concern      Roberth Robles

## 2021-02-03 PROBLEM — K22.70 BARRETT'S ESOPHAGUS WITHOUT DYSPLASIA: Status: ACTIVE | Noted: 2021-01-25

## 2021-02-03 PROBLEM — K31.A0 INTESTINAL METAPLASIA OF GASTRIC MUCOSA: Status: ACTIVE | Noted: 2021-01-25

## 2021-03-01 RX ORDER — ATORVASTATIN CALCIUM 80 MG/1
TABLET, FILM COATED ORAL
Qty: 90 TABLET | Refills: 3 | Status: SHIPPED | OUTPATIENT
Start: 2021-03-01 | End: 2021-03-03 | Stop reason: SDUPTHER

## 2021-03-03 ENCOUNTER — OFFICE VISIT (OUTPATIENT)
Dept: FAMILY MEDICINE CLINIC | Age: 71
End: 2021-03-03
Payer: COMMERCIAL

## 2021-03-03 VITALS
OXYGEN SATURATION: 98 % | DIASTOLIC BLOOD PRESSURE: 82 MMHG | WEIGHT: 155 LBS | BODY MASS INDEX: 22.56 KG/M2 | SYSTOLIC BLOOD PRESSURE: 130 MMHG | HEART RATE: 86 BPM | RESPIRATION RATE: 16 BRPM | TEMPERATURE: 97.1 F

## 2021-03-03 DIAGNOSIS — K22.70 BARRETT'S ESOPHAGUS WITHOUT DYSPLASIA: ICD-10-CM

## 2021-03-03 DIAGNOSIS — E78.2 MIXED HYPERLIPIDEMIA: Primary | ICD-10-CM

## 2021-03-03 DIAGNOSIS — I10 ESSENTIAL HYPERTENSION: ICD-10-CM

## 2021-03-03 DIAGNOSIS — K21.9 GASTROESOPHAGEAL REFLUX DISEASE WITHOUT ESOPHAGITIS: ICD-10-CM

## 2021-03-03 DIAGNOSIS — Z72.0 TOBACCO ABUSE: ICD-10-CM

## 2021-03-03 DIAGNOSIS — R91.1 LUNG NODULE: ICD-10-CM

## 2021-03-03 PROCEDURE — 99214 OFFICE O/P EST MOD 30 MIN: CPT | Performed by: NURSE PRACTITIONER

## 2021-03-03 RX ORDER — ATORVASTATIN CALCIUM 80 MG/1
TABLET, FILM COATED ORAL
Qty: 90 TABLET | Refills: 3 | Status: SHIPPED | OUTPATIENT
Start: 2021-03-03 | End: 2022-04-11 | Stop reason: SDUPTHER

## 2021-03-03 RX ORDER — LISINOPRIL 20 MG/1
20 TABLET ORAL 2 TIMES DAILY
Qty: 180 TABLET | Refills: 3 | Status: SHIPPED | OUTPATIENT
Start: 2021-03-03 | End: 2022-03-10

## 2021-03-03 RX ORDER — METOPROLOL SUCCINATE 50 MG/1
50 TABLET, EXTENDED RELEASE ORAL DAILY
Qty: 90 TABLET | Refills: 3 | Status: SHIPPED | OUTPATIENT
Start: 2021-03-03 | End: 2022-03-10

## 2021-03-03 RX ORDER — AMLODIPINE BESYLATE 10 MG/1
10 TABLET ORAL DAILY
Qty: 90 TABLET | Refills: 3 | Status: SHIPPED | OUTPATIENT
Start: 2021-03-03 | End: 2022-03-10

## 2021-03-03 RX ORDER — MULTIVITAMIN
TABLET ORAL
Qty: 30 TABLET | Status: CANCELLED | OUTPATIENT
Start: 2021-03-03

## 2021-03-03 RX ORDER — PANTOPRAZOLE SODIUM 40 MG/1
TABLET, DELAYED RELEASE ORAL
Qty: 90 TABLET | Refills: 3 | Status: SHIPPED | OUTPATIENT
Start: 2021-03-03 | End: 2022-04-11 | Stop reason: SDUPTHER

## 2021-03-03 RX ORDER — NICOTINE 21 MG/24HR
1 PATCH, TRANSDERMAL 24 HOURS TRANSDERMAL EVERY 24 HOURS
Qty: 30 PATCH | Refills: 1 | Status: SHIPPED | OUTPATIENT
Start: 2021-03-03 | End: 2021-06-14

## 2021-03-03 ASSESSMENT — ENCOUNTER SYMPTOMS
SHORTNESS OF BREATH: 0
ABDOMINAL PAIN: 0
NAUSEA: 0
VOMITING: 0
CHEST TIGHTNESS: 0
WHEEZING: 0
CONSTIPATION: 0
DIARRHEA: 0
COUGH: 0

## 2021-03-03 ASSESSMENT — PATIENT HEALTH QUESTIONNAIRE - PHQ9
SUM OF ALL RESPONSES TO PHQ QUESTIONS 1-9: 0
SUM OF ALL RESPONSES TO PHQ9 QUESTIONS 1 & 2: 0

## 2021-03-03 NOTE — ASSESSMENT & PLAN NOTE
Smoking 2-10 cigarettes a day. Not ready to quit. He wears a nicotine patch during the day. Does not smoke until the evening.

## 2021-03-03 NOTE — ASSESSMENT & PLAN NOTE
He did follow up with Dr. Isabella Ledezma. Supposed to have repeat CT scan done in May. The patient denies cough, chest pain, dyspnea, wheezing or hemoptysis.

## 2021-03-03 NOTE — ASSESSMENT & PLAN NOTE
On protonix. He did have EGD completed and was found to have some healed ulcers. Diagnosed with Cobian's Esophagus.

## 2021-03-03 NOTE — ASSESSMENT & PLAN NOTE
Diagnosed on recent EGD. Recommendations to continue protonix. Repeat EGD in 3-5 years to assess stability.

## 2021-03-03 NOTE — PROGRESS NOTES
SUBJECTIVE:    Meaghan Leach  1950  65 y.o.  male      Chief Complaint   Patient presents with    Follow-up     patient is here for 4 month follow up      HPI     No Known Allergies    Current Outpatient Medications   Medication Sig Dispense Refill    pantoprazole (PROTONIX) 40 MG tablet TAKE 1 TABLET BY MOUTH EVERY DAY 90 tablet 3    amLODIPine (NORVASC) 10 MG tablet Take 1 tablet by mouth daily 90 tablet 3    atorvastatin (LIPITOR) 80 MG tablet TAKE 1 TABLET BY MOUTH EVERY DAY 90 tablet 3    lisinopril (PRINIVIL;ZESTRIL) 20 MG tablet Take 1 tablet by mouth 2 times daily 180 tablet 3    metoprolol succinate (TOPROL XL) 50 MG extended release tablet Take 1 tablet by mouth daily 90 tablet 3    nicotine (NICODERM CQ) 14 MG/24HR Place 1 patch onto the skin every 24 hours 30 patch 1    Multiple Vitamin (MULTI VITAMIN DAILY PO) Take by mouth      aspirin 81 MG tablet Take 81 mg by mouth daily. No current facility-administered medications for this visit.            Past Medical History:   Diagnosis Date    Cobian esophagus     Chronic rhinitis     COPD (chronic obstructive pulmonary disease) (HCC)     Cough     GERD (gastroesophageal reflux disease)     Hx of blood clots     Hyperlipidemia     Hypertension     Hypertrophy of prostate     Pleuritic chest pain     Pulmonary emboli (HCC)     Tachycardia      Past Surgical History:   Procedure Laterality Date    COLONOSCOPY  08/19/2016    10 small colon polyps, int hem    COLONOSCOPY  09/06/2017    hems grade 2    COLONOSCOPY  01/18/2021    Total colonoscopy with polypectomies by Dr. Lin Alcantara in [facility] N/A 1/18/2021    COLONOSCOPY POLYPECTOMY SNARE/COLD BIOPSY performed by Eric Duval MD at [facility]. [address], [address], DIAGNOSTIC  01/18/2021    Esophagogastroduodenoscopy with biopsies by Dr. Lin Alcantara in [address] [facility] [address] LITHOTRIPSY      UPPER GASTROINTESTINAL ENDOSCOPY N/A 1/18/2021    EGD BIOPSY performed by Chioma Riggs MD at [address] History     Socioeconomic History    Marital status:      Spouse name: None    Number of children: None    Years of education: None    Highest education level: None   Occupational History    None   Social Needs    Financial resource strain: None    Food insecurity     Worry: None     Inability: None    Transportation needs     Medical: None     Non-medical: None   Tobacco Use    Smoking status: Current Some Day Smoker     Packs/day: 0.50     Years: 50.00     Pack years: 25.00     Types: Cigarettes    Smokeless tobacco: Never Used    Tobacco comment: Pt is not smoking at work only in evening. Substance and Sexual Activity    Alcohol use: Yes     Alcohol/week: 4.0 standard drinks     Types: 4 Cans of beer per week     Comment: 4 beers day     Drug use: Not Currently    Sexual activity: Yes     Partners: Female   Lifestyle    Physical activity     Days per week: None     Minutes per session: None    Stress: None   Relationships    Social connections     Talks on phone: None     Gets together: None     Attends Christian service: None     Active member of club or organization: None     Attends meetings of clubs or organizations: None     Relationship status: None    Intimate partner violence     Fear of current or ex partner: None     Emotionally abused: None     Physically abused: None     Forced sexual activity: None   Other Topics Concern    None   Social History Narrative    None         Tobacco abuse  Smoking 2-10 cigarettes a day. Not ready to quit. He wears a nicotine patch during the day. Does not smoke until the evening. Lung nodule  He did follow up with Dr. Charlotte Denton. Supposed to have repeat CT scan done in May. The patient denies cough, chest pain, dyspnea, wheezing or hemoptysis. Mixed hyperlipidemia  On lipitor, aspirin. Continues to smoke.      Gastroesophageal reflux disease without esophagitis  On protonix. He did have EGD completed and was found to have some healed ulcers. Diagnosed with Cobian's Esophagus. Essential hypertension  Stable. Patient denies any exertional chest pain, dyspnea, palpitations, syncope, orthopnea, edema or paroxysmal nocturnal dyspnea. Cobian's esophagus without dysplasia  Diagnosed on recent EGD. Recommendations to continue protonix. Repeat EGD in 3-5 years to assess stability. Review of Systems   Constitutional: Negative for appetite change, chills, fatigue and unexpected weight change. Respiratory: Negative for cough, chest tightness, shortness of breath and wheezing. Cardiovascular: Negative for chest pain, palpitations and leg swelling. Gastrointestinal: Negative for abdominal pain, constipation, diarrhea, nausea and vomiting. Musculoskeletal: Negative for arthralgias. Neurological: Negative for weakness, numbness and headaches. Hematological: Negative for adenopathy. OBJECTIVE:    /82 (Site: Right Upper Arm, Position: Sitting, Cuff Size: Medium Adult)   Pulse 86   Temp 97.1 °F (36.2 °C) (Infrared)   Resp 16   Wt 155 lb (70.3 kg)   SpO2 98%   BMI 22.56 kg/m²     Physical Exam  Constitutional:       Appearance: Normal appearance. He is well-developed. HENT:      Head: Normocephalic and atraumatic. Right Ear: Hearing normal.      Left Ear: Hearing normal.   Neck:      Musculoskeletal: Normal range of motion and neck supple. Vascular: No carotid bruit or JVD. Cardiovascular:      Rate and Rhythm: Normal rate and regular rhythm. Heart sounds: Normal heart sounds. No murmur. No friction rub. No gallop. Pulmonary:      Effort: Pulmonary effort is normal. No respiratory distress. Breath sounds: Normal breath sounds. No wheezing, rhonchi or rales. Abdominal:      Palpations: Abdomen is soft. Musculoskeletal: Normal range of motion. Skin:     General: Skin is warm and dry.    Neurological:      General: No focal deficit present. Mental Status: He is alert and oriented to person, place, and time. Psychiatric:         Mood and Affect: Mood normal.         Behavior: Behavior normal. Behavior is cooperative. Thought Content: Thought content normal.         ASSESSMENT/PLAN:    1. Gastroesophageal reflux disease without esophagitis  Continue protonix. Avoid exacerbating foods  - pantoprazole (PROTONIX) 40 MG tablet; TAKE 1 TABLET BY MOUTH EVERY DAY  Dispense: 90 tablet; Refill: 3    2. Essential hypertension  Continue current medication. Monitor BP  - amLODIPine (NORVASC) 10 MG tablet; Take 1 tablet by mouth daily  Dispense: 90 tablet; Refill: 3  - lisinopril (PRINIVIL;ZESTRIL) 20 MG tablet; Take 1 tablet by mouth 2 times daily  Dispense: 180 tablet; Refill: 3  - metoprolol succinate (TOPROL XL) 50 MG extended release tablet; Take 1 tablet by mouth daily  Dispense: 90 tablet; Refill: 3    3. Tobacco abuse  Counseled on cessation    4. Mixed hyperlipidemia  refilled  - atorvastatin (LIPITOR) 80 MG tablet; TAKE 1 TABLET BY MOUTH EVERY DAY  Dispense: 90 tablet; Refill: 3    5. Lung nodule  Complete CT as ordered. Follow up with Dr. Brianna Guaman as scheduled    6. Cobian's esophagus without dysplasia  Continue protonix. Repeat EGD in 3-5 years per recommendation           Return in about 6 months (around 9/3/2021).       (Please note that portions of this note may have been completed with a voice recognition program. Efforts were made to edit the dictations but occasionally words aremis-transcribed.)

## 2021-06-14 RX ORDER — NICOTINE 21 MG/24HR
1 PATCH, TRANSDERMAL 24 HOURS TRANSDERMAL EVERY 24 HOURS
Qty: 28 PATCH | Refills: 1 | Status: SHIPPED | OUTPATIENT
Start: 2021-06-14 | End: 2021-09-08 | Stop reason: SDUPTHER

## 2021-09-08 ENCOUNTER — OFFICE VISIT (OUTPATIENT)
Dept: FAMILY MEDICINE CLINIC | Age: 71
End: 2021-09-08
Payer: COMMERCIAL

## 2021-09-08 VITALS
WEIGHT: 150.8 LBS | OXYGEN SATURATION: 96 % | TEMPERATURE: 98.1 F | SYSTOLIC BLOOD PRESSURE: 122 MMHG | HEART RATE: 80 BPM | DIASTOLIC BLOOD PRESSURE: 70 MMHG | RESPIRATION RATE: 16 BRPM | BODY MASS INDEX: 21.95 KG/M2

## 2021-09-08 DIAGNOSIS — K21.9 GASTROESOPHAGEAL REFLUX DISEASE WITHOUT ESOPHAGITIS: ICD-10-CM

## 2021-09-08 DIAGNOSIS — E78.2 MIXED HYPERLIPIDEMIA: ICD-10-CM

## 2021-09-08 DIAGNOSIS — Z72.0 TOBACCO ABUSE: ICD-10-CM

## 2021-09-08 DIAGNOSIS — R91.1 LUNG NODULE: ICD-10-CM

## 2021-09-08 DIAGNOSIS — K22.70 BARRETT'S ESOPHAGUS WITHOUT DYSPLASIA: ICD-10-CM

## 2021-09-08 DIAGNOSIS — I10 ESSENTIAL HYPERTENSION: ICD-10-CM

## 2021-09-08 DIAGNOSIS — Z86.711 HISTORY OF PULMONARY EMBOLISM: Primary | ICD-10-CM

## 2021-09-08 PROCEDURE — 99214 OFFICE O/P EST MOD 30 MIN: CPT | Performed by: NURSE PRACTITIONER

## 2021-09-08 RX ORDER — NICOTINE 21 MG/24HR
1 PATCH, TRANSDERMAL 24 HOURS TRANSDERMAL EVERY 24 HOURS
Qty: 28 PATCH | Refills: 3 | Status: SHIPPED | OUTPATIENT
Start: 2021-09-08 | End: 2022-04-11 | Stop reason: SDUPTHER

## 2021-09-08 ASSESSMENT — ENCOUNTER SYMPTOMS
WHEEZING: 0
DIARRHEA: 0
VOMITING: 0
ABDOMINAL PAIN: 0
CONSTIPATION: 0
SHORTNESS OF BREATH: 0
COUGH: 0
CHEST TIGHTNESS: 0
NAUSEA: 0

## 2021-09-08 ASSESSMENT — PATIENT HEALTH QUESTIONNAIRE - PHQ9
SUM OF ALL RESPONSES TO PHQ9 QUESTIONS 1 & 2: 0
2. FEELING DOWN, DEPRESSED OR HOPELESS: 0
SUM OF ALL RESPONSES TO PHQ QUESTIONS 1-9: 0
1. LITTLE INTEREST OR PLEASURE IN DOING THINGS: 0
SUM OF ALL RESPONSES TO PHQ QUESTIONS 1-9: 0
SUM OF ALL RESPONSES TO PHQ QUESTIONS 1-9: 0

## 2021-09-08 NOTE — ASSESSMENT & PLAN NOTE
Chest CT in October 2020:  Stable nodule in right lower lobe. New 2-3 mm nodules in right lower lobe--would consider follow up with pulmonology for their opinion.    Stable changes in left lung base  Emphysema

## 2021-09-08 NOTE — PROGRESS NOTES
SUBJECTIVE:    Lane Doll  1950  65 y.o.  male      Chief Complaint   Patient presents with    Other     Pt here for a 6 month f/u     HPI    No Known Allergies    Current Outpatient Medications   Medication Sig Dispense Refill    nicotine (NICODERM CQ) 14 MG/24HR Place 1 patch onto the skin every 24 hours 28 patch 3    pantoprazole (PROTONIX) 40 MG tablet TAKE 1 TABLET BY MOUTH EVERY DAY 90 tablet 3    amLODIPine (NORVASC) 10 MG tablet Take 1 tablet by mouth daily 90 tablet 3    atorvastatin (LIPITOR) 80 MG tablet TAKE 1 TABLET BY MOUTH EVERY DAY 90 tablet 3    lisinopril (PRINIVIL;ZESTRIL) 20 MG tablet Take 1 tablet by mouth 2 times daily 180 tablet 3    metoprolol succinate (TOPROL XL) 50 MG extended release tablet Take 1 tablet by mouth daily 90 tablet 3    Multiple Vitamin (MULTI VITAMIN DAILY PO) Take by mouth      aspirin 81 MG tablet Take 81 mg by mouth daily. No current facility-administered medications for this visit.           Past Medical History:   Diagnosis Date    Cobian esophagus     Chronic rhinitis     COPD (chronic obstructive pulmonary disease) (HCC)     Cough     GERD (gastroesophageal reflux disease)     Hx of blood clots     Hyperlipidemia     Hypertension     Hypertrophy of prostate     Pleuritic chest pain     Pulmonary emboli (HCC)     Tachycardia      Past Surgical History:   Procedure Laterality Date    COLONOSCOPY  08/19/2016    10 small colon polyps, int hem    COLONOSCOPY  09/06/2017    hems grade 2    COLONOSCOPY  01/18/2021    Total colonoscopy with polypectomies by Dr. Rossana Villatoro in [facility] N/A 1/18/2021    COLONOSCOPY POLYPECTOMY SNARE/COLD BIOPSY performed by Momo Gonzalez MD at [facility], DIAGNOSTIC  01/18/2021    Esophagogastroduodenoscopy with biopsies by Dr. Rossana Villatoro in [address] [place] [address] Av LITHOTRIPSY      UPPER GASTROINTESTINAL ENDOSCOPY N/A 1/18/2021    EGD BIOPSY performed by Selma Wang Madhu Murguia MD at [address] History     Socioeconomic History    Marital status:      Spouse name: None    Number of children: None    Years of education: None    Highest education level: None   Occupational History    None   Tobacco Use    Smoking status: Current Some Day Smoker     Packs/day: 0.50     Years: 50.00     Pack years: 25.00     Types: Cigarettes    Smokeless tobacco: Never Used   Vaping Use    Vaping Use: Never used   Substance and Sexual Activity    Alcohol use: Yes     Alcohol/week: 4.0 standard drinks     Types: 4 Cans of beer per week     Comment: 4 beers day     Drug use: Not Currently    Sexual activity: Yes     Partners: Female   Other Topics Concern    None   Social History Narrative    None     Social Determinants of Health     Financial Resource Strain:     Difficulty of Paying Living Expenses:    Food Insecurity:     Worried About Running Out of Food in the Last Year:     Ran Out of Food in the Last Year:    Transportation Needs:     Lack of Transportation (Medical):  Lack of Transportation (Non-Medical):    Physical Activity:     Days of Exercise per Week:     Minutes of Exercise per Session:    Stress:     Feeling of Stress :    Social Connections:     Frequency of Communication with Friends and Family:     Frequency of Social Gatherings with Friends and Family:     Attends Yazdanism Services:     Active Member of Clubs or Organizations:     Attends Club or Organization Meetings:     Marital Status:    Intimate Partner Violence:     Fear of Current or Ex-Partner:     Emotionally Abused:     Physically Abused:     Sexually Abused:          Cobian's esophagus without dysplasia  Repeat EGD in 3-5 to assess stability. Essential hypertension  Stable. Patient denies any exertional chest pain, dyspnea, palpitations, syncope, orthopnea, edema or paroxysmal nocturnal dyspnea.        Gastroesophageal reflux disease without Neurological:      General: No focal deficit present. Mental Status: He is alert and oriented to person, place, and time. Psychiatric:         Mood and Affect: Mood normal.         Behavior: Behavior normal. Behavior is cooperative. Thought Content: Thought content normal.         ASSESSMENT/PLAN:    1. Cobian's esophagus without dysplasia  Continue protonix. Repeat EGD in 3-5 years    2. Essential hypertension  DASH diet, monitor BP  - CBC Auto Differential; Future  - Comprehensive Metabolic Panel; Future    3. Gastroesophageal reflux disease without esophagitis  Continue protonix    4. History of pulmonary embolism  - Katherine Briseno MD, Pulmonology, [address]    5. Tobacco abuse  Counseled on cessation    6. Mixed hyperlipidemia  Continue lipitor  - Lipid Panel; Future    7. Lung nodule  Referred to pulmonology for evaluation due to new nodules             Return in about 6 months (around 3/8/2022).       (Please note that portions of this note may have been completed with a voice recognition program. Efforts were made to edit the dictations but occasionally words aremis-transcribed.)

## 2021-10-11 ENCOUNTER — INITIAL CONSULT (OUTPATIENT)
Dept: PULMONOLOGY | Age: 71
End: 2021-10-11
Payer: COMMERCIAL

## 2021-10-11 VITALS
HEART RATE: 95 BPM | DIASTOLIC BLOOD PRESSURE: 78 MMHG | WEIGHT: 147.8 LBS | HEIGHT: 70 IN | OXYGEN SATURATION: 95 % | BODY MASS INDEX: 21.16 KG/M2 | SYSTOLIC BLOOD PRESSURE: 116 MMHG

## 2021-10-11 DIAGNOSIS — Z01.818 PREOP TESTING: ICD-10-CM

## 2021-10-11 DIAGNOSIS — Z00.00 HEALTHCARE MAINTENANCE: ICD-10-CM

## 2021-10-11 DIAGNOSIS — R91.1 LUNG NODULE: ICD-10-CM

## 2021-10-11 DIAGNOSIS — R06.02 SOBOE (SHORTNESS OF BREATH ON EXERTION): Primary | ICD-10-CM

## 2021-10-11 DIAGNOSIS — J43.9 PULMONARY EMPHYSEMA, UNSPECIFIED EMPHYSEMA TYPE (HCC): ICD-10-CM

## 2021-10-11 DIAGNOSIS — Z01.811 PREOP PULMONARY/RESPIRATORY EXAM: Primary | ICD-10-CM

## 2021-10-11 DIAGNOSIS — Z87.891 PERSONAL HISTORY OF TOBACCO USE: ICD-10-CM

## 2021-10-11 PROCEDURE — G0296 VISIT TO DETERM LDCT ELIG: HCPCS | Performed by: NURSE PRACTITIONER

## 2021-10-11 PROCEDURE — 99214 OFFICE O/P EST MOD 30 MIN: CPT | Performed by: NURSE PRACTITIONER

## 2021-10-11 ASSESSMENT — COPD QUESTIONNAIRES
QUESTION_9 IN PAST WEEK AVERAGE AMOUNT YOUR SOCIAL ACTIVITIES (SUCH AS TALKING, BEING WITH CHILDREN, VISITING FRIENDS/RELATIVES) WERE LIMITED DUE TO BREATHING PROBLEMS: 0
QUESTION_4 IN PAST WEEK HOW OFTEN DID YOU FEEL DEPRESSED (DOWN) BECAUSE OF YOUR BREATHING PROBLEMS: 0
QUESTION_8 IN PAST WEEK AVERAGE AMOUNT YOUR ABILITY TO DO MODERATE PHYSICAL ACTIVITIES (SUCH AS WALKING, HOUSEWORK, CARRYING THINGS) WERE LIMITED BECAUSE OF BREATHING PROBLEMS: 0
QUESTION_2 IN PAST WEEK HOW OFTEN DID YOU FEEL SHORT OF BREATH WHILE DOING PHYSICAL ACTIVITIES: 0
QUESTION_7 IN PAST WEEK AVERAGE AMOUNT YOUR ABILITY TO DO STRENUOUS PHYSICAL ACTIVITIES (SUCH AS CLIMBING STAIRS, HURRYING, PARTICIPATING IN SPORTS) WERE LIMITED BECAUSE OF BREATHING PROBLEMS: 1
QUESTION_10 IN PAST WEEK AVERAGE AMOUNT YOUR ABILITY TO DO SOCIAL ACTIVITIES (SUCH AS TALKING, BEING WITH CHILDREN, VISITING FRIENDS/RELATIVES) WERE LIMITED BECAUSE OF BREATHING PROBLEMS: 0
QUESTION_6 IN PAST WEEK HOW MUCH OF THE TIME DID YOU PRODUCT SPUTUM OR PHLEGM: 0
QUESTION_5 IN PAST WEEK HOW MUCH OF THE TIME DID YOU COUGH: 0
QUESTION_3 IN PAST WEEK HOW OFTEN DID YOU FEEL CONCERNED ABOUT GETTING A COLD OR YOUR BREATHING GETTING WORSE: 0
QUESTION_1 IN PAST WEEK HOW OFTEN DID YOU FEEL SHORT OF BREATH WHILE AT REST: 0

## 2021-10-11 ASSESSMENT — SLEEP AND FATIGUE QUESTIONNAIRES
HOW LIKELY ARE YOU TO NOD OFF OR FALL ASLEEP WHILE SITTING AND READING: 0
NECK CIRCUMFERENCE (INCHES): 13.5
HOW LIKELY ARE YOU TO NOD OFF OR FALL ASLEEP WHILE WATCHING TV: 0
ESS TOTAL SCORE: 1
HOW LIKELY ARE YOU TO NOD OFF OR FALL ASLEEP WHEN YOU ARE A PASSENGER IN A CAR FOR AN HOUR WITHOUT A BREAK: 0
HOW LIKELY ARE YOU TO NOD OFF OR FALL ASLEEP WHILE SITTING QUIETLY AFTER LUNCH WITHOUT ALCOHOL: 1
HOW LIKELY ARE YOU TO NOD OFF OR FALL ASLEEP WHILE SITTING INACTIVE IN A PUBLIC PLACE: 0
HOW LIKELY ARE YOU TO NOD OFF OR FALL ASLEEP WHILE LYING DOWN TO REST IN THE AFTERNOON WHEN CIRCUMSTANCES PERMIT: 0
HOW LIKELY ARE YOU TO NOD OFF OR FALL ASLEEP WHILE SITTING AND TALKING TO SOMEONE: 0
HOW LIKELY ARE YOU TO NOD OFF OR FALL ASLEEP IN A CAR, WHILE STOPPED FOR A FEW MINUTES IN TRAFFIC: 0

## 2021-10-11 NOTE — PROGRESS NOTES
for 50 years, smoked at most 2 ppd in younger day, currently smoking 1/2 pack/day  PCP: Saray Rendon CNP    Past Medical History:  Past Medical History:   Diagnosis Date    Cobian esophagus     Chronic rhinitis     COPD (chronic obstructive pulmonary disease) (HCC)     Cough     GERD (gastroesophageal reflux disease)     Hx of blood clots     Hyperlipidemia     Hypertension     Hypertrophy of prostate     Pleuritic chest pain     Pulmonary emboli (HCC)     Tachycardia        Current Medications:      Current Outpatient Medications:     nicotine (NICODERM CQ) 14 MG/24HR, Place 1 patch onto the skin every 24 hours (Patient taking differently: Place 1 patch onto the skin every 24 hours Only when working.), Disp: 28 patch, Rfl: 3    pantoprazole (PROTONIX) 40 MG tablet, TAKE 1 TABLET BY MOUTH EVERY DAY, Disp: 90 tablet, Rfl: 3    amLODIPine (NORVASC) 10 MG tablet, Take 1 tablet by mouth daily, Disp: 90 tablet, Rfl: 3    atorvastatin (LIPITOR) 80 MG tablet, TAKE 1 TABLET BY MOUTH EVERY DAY, Disp: 90 tablet, Rfl: 3    lisinopril (PRINIVIL;ZESTRIL) 20 MG tablet, Take 1 tablet by mouth 2 times daily, Disp: 180 tablet, Rfl: 3    metoprolol succinate (TOPROL XL) 50 MG extended release tablet, Take 1 tablet by mouth daily, Disp: 90 tablet, Rfl: 3    Multiple Vitamin (MULTI VITAMIN DAILY PO), Take by mouth, Disp: , Rfl:     aspirin 81 MG tablet, Take 81 mg by mouth daily. , Disp: , Rfl:     SYMBICORT 160-4.5 MCG/ACT AERO, Inhale 2 puffs into the lungs 2 times daily, Disp: 1 each, Rfl: 3    No Known Allergies    Social History:    Social History     Socioeconomic History    Marital status:      Spouse name: None    Number of children: None    Years of education: None    Highest education level: None   Occupational History    None   Tobacco Use    Smoking status: Current Some Day Smoker     Packs/day: 0.50     Years: 50.00     Pack years: 25.00     Types: Cigarettes    Smokeless tobacco: Never Used   Vaping Use    Vaping Use: Never used   Substance and Sexual Activity    Alcohol use: Yes     Alcohol/week: 4.0 standard drinks     Types: 4 Cans of beer per week     Comment: 4 beers day     Drug use: Not Currently    Sexual activity: Yes     Partners: Female   Other Topics Concern    None   Social History Narrative    None     Social Determinants of Health     Financial Resource Strain:     Difficulty of Paying Living Expenses: Not on file   Food Insecurity:     Worried About Running Out of Food in the Last Year: Not on file    [place] of Food in the Last Year: Not on file   Transportation Needs:     Lack of Transportation (Medical): Not on file    Lack of Transportation (Non-Medical): Not on file   Physical Activity:     Days of Exercise per Week: Not on file    Minutes of Exercise per Session: Not on file   Stress:     Feeling of Stress : Not on file   Social Connections:     Frequency of Communication with Friends and Family: Not on file    Frequency of Social Gatherings with Friends and Family: Not on file    Attends Denominational Services: Not on file    Active Member of [address] [place] [address] or Organizations: Not on file    Attends Club or Organization Meetings: Not on file    Marital Status: Not on file   Intimate Partner Violence:     Fear of Current or Ex-Partner: Not on file    Emotionally Abused: Not on file    Physically Abused: Not on file    Sexually Abused: Not on file   Housing Stability:     Unable to Pay for Housing in the Last Year: Not on file    Number of [place] in the Last Year: Not on file    Unstable Housing in the Last Year: Not on file       Family History:    Family History   Problem Relation Age of Onset    Other Mother         \"D fib\"    COPD Father          REVIEW OF SYSTEMS:    CONSTITUTIONAL:  Negative for fevers, chills, diaphoresis, activity change, appetite change, night sweats, unexpected weight change.    HEENT:  Negative for hearing loss,  sinus pressure, nasal congestion, epistaxis and snoring  RESPIRATORY:  Negative SOB, cough, wheeze  CARDIOVASCULAR:  Negative for chest pain, palpitations, exertional chest pressure/discomfort, edema, syncope  GASTROINTESTINAL: Negative for nausea, vomiting, diarrhea, constipation, blood in stool and abdominal pain  GENITOURINARY:  Negative for frequency, dysuria and hematuria  HEMATOLOGIC/LYMPHATIC:  Negative for easy bruising, bleeding and lymphadenopathy  ALLERGIC/IMMUNOLOGIC:  Negative for recurrent infections, angioedema, anaphylaxis and drug reaction  MUSCULOSKELETAL:  Negative for  pain, joint swelling, decreased range of motion and muscle weakness  NEURO: Negative for headache, AMS, decrease sensations  SKIN: Negative for rashes or lesions      Objective:   VITALS:   Vitals:    10/11/21 1525   BP: 116/78   Pulse: 95   SpO2: 95%   Weight: 147 lb 12.8 oz (67 kg)   Height: 5' 9.5\" (1.765 m)     Neck circumference: 13.5  Inches  [place] - Total score: 1  MALLAMPATI:  Body mass index is 21.51 kg/m². PHYSICAL EXAM:    CONSTITUTIONAL:  awake, alert, cooperative, no apparent distress, and appears stated age  HEENT:  Supple and nontender,  trachea midline, no adenopathy, thyroid normal, no JVD, no wheezing or stridor over neck  CHEST: Chest expansion equal and symmetrical, no intercostal retraction, no increased work of breathing  LUNGS: Bilateral breath sounds clear and equal to auscultation, good air movement, no use of accessory muscles to support respiratory effort. No forced expiratory wheeze, no rales, no rhonchi, no cough noted during assessment. O2 sat at rest on room air is 95%  CARDIOVASCULAR: Normal S1 and S2 , no murmurs or gallops ,no pericardial rubs  ABDOMEN:  normal bowel sounds, non-distended and no masses palpated, and no tenderness to palpation. LYMPHADENOPATHY:  no axillary or supraclavicular adenopathy.  No cervical adenopathy  EXTREMITIES: No edema, no inflammation, no tenderness, no clubbing of digits  NEURO: Oriented X 3, No focal deficits  SKIN: warm and dry      RADIOLOGY:  CT lung screen to be obtained    PFT:   To be obtained    Assessment      1. SOBOE (shortness of breath on exertion)    2. Pulmonary emphysema, unspecified emphysema type ([address]    3. Lung nodule    4. Personal history of tobacco use    5. Preop testing    6. Healthcare maintenance        Plan:  --PFT in the next few weeks  --COVID -19 prescreening test required prior to PFT  --Smoking cessation information given, is attempting to quit  --CT lung screen  --While he has been vaccinated at this time for COVID-19, I strongly recommend that he continue Coronavirus precaution including: Wearing mask when in public and when in contact with persons who have not been immunized, social distancing when needing to be in public, handwashing practice, wiping items touched in public such as gas pumps, door handles, shopping carts, etc.  --Recommend yearly flu vaccination  --Recommend Covid 19 booster when available  --Recommend Pneumovax vaccination  --Have discussed signs and symptoms of COPD exacerbation and why it is important to monitor for bronchial infections. Instructed to call office should he develop signs of COPD exacerbation/bronchial infection  --I will continue to follow in pulmonary clinic      Return in about 2 months (around 12/11/2021). This dictation was performed with a verbal recognition program and it was checked for errors. It is possible that there are still dictated errors within this office note. Any errors should be brought immediately to my attention for correction. All efforts were made to ensure that this office note is accurate.        Electronically signed by ANGIE Douglas CNP on 1/1/2022 at 6:11 PM  Low Dose CT (LDCT) Lung Screening criteria met:     Age 55-77(Medicare) or 50-80 (USPSTF)   Pack year smoking >30 (Medicare) or >20 (USPSTF)   Still smoking or less than 15 year since quit   No sign or symptoms of lung cancer   > 11 months since last LDCT     Risks and benefits of lung cancer screening with LDCT scans discussed:    Significance of positive screen - False-positive LDCT results often occur. 95% of all positive results do not lead to a diagnosis of cancer. Usually further imaging can resolve most false-positive results; however, some patients may require invasive procedures. Over diagnosis risk - 10% to 12% of screen-detected lung cancer cases are over diagnosed--that is, the cancer would not have been detected in the patient's lifetime without the screening. Need for follow up screens annually to continue lung cancer screening effectiveness     Risks associated with radiation from annual LDCT- Radiation exposure is about the same as for a mammogram, which is about 1/3 of the annual background radiation exposure from everyday life. Starting screening at age 54 is not likely to increase cancer risk from radiation exposure. Patients with comorbidities resulting in life expectancy of < 10 years, or that would preclude treatment of an abnormality identified on CT, should not be screened due to lack of benefit.     To obtain maximal benefit from this screening, smoking cessation and long-term abstinence from smoking is critical

## 2021-11-01 ENCOUNTER — HOSPITAL ENCOUNTER (OUTPATIENT)
Age: 71
Discharge: HOME | End: 2021-11-01
Payer: COMMERCIAL

## 2021-11-01 DIAGNOSIS — Z01.811 PREOP PULMONARY/RESPIRATORY EXAM: Primary | ICD-10-CM

## 2021-11-01 PROCEDURE — U0005 INFEC AGEN DETEC AMPLI PROBE: HCPCS

## 2021-11-01 PROCEDURE — U0003 INFECTIOUS AGENT DETECTION BY NUCLEIC ACID (DNA OR RNA); SEVERE ACUTE RESPIRATORY SYNDROME CORONAVIRUS 2 (SARS-COV-2) (CORONAVIRUS DISEASE [COVID-19]), AMPLIFIED PROBE TECHNIQUE, MAKING USE OF HIGH THROUGHPUT TECHNOLOGIES AS DESCRIBED BY CMS-2020-01-R: HCPCS

## 2021-11-02 LAB
SARS-COV-2: NOT DETECTED
SOURCE: NORMAL

## 2021-11-08 ENCOUNTER — HOSPITAL ENCOUNTER (OUTPATIENT)
Dept: CARDIAC REHAB | Age: 71
Discharge: HOME | End: 2021-11-08
Payer: COMMERCIAL

## 2021-11-08 DIAGNOSIS — Z87.891 PERSONAL HISTORY OF TOBACCO USE: ICD-10-CM

## 2021-11-08 DIAGNOSIS — R06.02 SOBOE (SHORTNESS OF BREATH ON EXERTION): ICD-10-CM

## 2021-11-08 PROCEDURE — 94640 AIRWAY INHALATION TREATMENT: CPT

## 2021-11-08 PROCEDURE — 94729 DIFFUSING CAPACITY: CPT

## 2021-11-08 PROCEDURE — 94727 GAS DIL/WSHOT DETER LNG VOL: CPT

## 2021-11-08 PROCEDURE — 94060 EVALUATION OF WHEEZING: CPT

## 2021-11-11 ENCOUNTER — HOSPITAL ENCOUNTER (OUTPATIENT)
Dept: CT IMAGING | Age: 71
Discharge: HOME | End: 2021-11-11
Payer: COMMERCIAL

## 2021-11-11 DIAGNOSIS — Z87.891 PERSONAL HISTORY OF TOBACCO USE: ICD-10-CM

## 2021-11-11 PROCEDURE — 71271 CT THORAX LUNG CANCER SCR C-: CPT

## 2021-12-06 ENCOUNTER — OFFICE VISIT (OUTPATIENT)
Dept: PULMONOLOGY | Age: 71
End: 2021-12-06
Payer: COMMERCIAL

## 2021-12-06 DIAGNOSIS — R06.02 SOBOE (SHORTNESS OF BREATH ON EXERTION): ICD-10-CM

## 2021-12-06 DIAGNOSIS — Z87.891 PERSONAL HISTORY OF TOBACCO USE: ICD-10-CM

## 2021-12-06 DIAGNOSIS — J43.9 PULMONARY EMPHYSEMA, UNSPECIFIED EMPHYSEMA TYPE (HCC): Primary | ICD-10-CM

## 2021-12-06 DIAGNOSIS — R91.1 LUNG NODULE: ICD-10-CM

## 2021-12-06 PROCEDURE — 99214 OFFICE O/P EST MOD 30 MIN: CPT | Performed by: NURSE PRACTITIONER

## 2021-12-06 RX ORDER — BUDESONIDE AND FORMOTEROL FUMARATE DIHYDRATE 160; 4.5 UG/1; UG/1
2 AEROSOL RESPIRATORY (INHALATION) 2 TIMES DAILY
Qty: 1 EACH | Refills: 3 | Status: SHIPPED | OUTPATIENT
Start: 2021-12-06 | End: 2022-04-11

## 2021-12-06 NOTE — PROGRESS NOTES
Pulmonary Clinic Office Follow up     Luis Enrique Damico is a 66 y.o. male with history of lung nodule, current smoker, HTN, GERD, pulmonary emboli, HLD,-insomnia, history of PE, esophagitis, hiatal hernia, Cobian's esophagus, presents today to the pulmonary clinic for follow up. Ann Vega was initially seen on 10/11/2021 in consult for history of pulmonary emboli new findings of pulmonary nodules in right lower lobe seen on CT scan 10/2021. Ann Vega states he has not required hospitalization, visits to the emergency room, walk-in clinic, urgent care for respiratory related illness since last visit. Since Ann Vega was last seen he did complete a pulmonary function test which demonstrated FEV1 of 67% and 70% postbronchodilator therapy. Overall testing demonstrates moderate obstructive disease with minimal restrictive component, along with moderate diffusion defect. He also completed a CT lung screening which demonstrated severe emphysema. I will start him on Symbicort, I have instructed him in the use of Symbicort with a spacer for enhance medication delivery. I have also instructed him to use the spacer when he uses his albuterol rescue inhaler. We will repeat pulmonary function test in 1 year    CT lung screening was completed on 11/11/2021 which demonstrated no new nodules but worsening emphysema. Ann Vega does continue to smoke AGAINST MEDICAL ADVICE. He is currently smoking anywhere from 2 to 10 cigarettes a day. He is on a NicoDerm patch which was recently renewed by his primary care provider. I have discussed with him the importance of smoking cessation given the worsening of his CT findings for severe emphysema. I have instructed Ann Vega that I do not recommend the replacement of cigarettes with other nicotine products such as nicotine patches or nicotine gum.   I do recommend that he stop immediately, that he substitute cigarettes with sugar-free candy or sugar-free gum, raw fruits or raw vegetables. Should he need to have something to satisfy the hand to mouth action that he gets with cigarettes I have recommended that he take a drinking straw cut it in half and use it as if it is a cigarette, or try a toothpick. I have discussed how continuation of cigarette smoking will lead to worsening of lung disease, cardiovascular disease and deterioration of overall health. There are no new lung nodules noted on CT scan. Stable lung nodule is present in right lower lobe and has been present without change since 4/8/2015. We will repeat CT scan in 1 year. Alex Calvo states they are practicing social distancing, wearing a mask when out in public, washing hands frequently or using hand . Denies any fever, chills, malaise, change in sensation of taste or smell, headache or lightheadedness. Denies any known contacts with persons with respiratory infection, positive for coronavirus or under investigation for possible coronavirus exposure.       Influenza immunization: 11/4/2021  Pneumococcal immunization: Prevnar 13 on 10/19/2016,  PPV23 on 11/16/12015  COVID-19 immunization: 10/3/2021, 9/12/2021 - Jabier Moore  Smoking history: smoked for 50 years, smoked at most 2 ppd in younger day, currently smoking 1/2 pack/day  PCP: Carrington Banda CNP    Past Medical History:  Past Medical History:   Diagnosis Date    Cobian esophagus     Chronic rhinitis     COPD [facility]chronic obstructive pulmonary disease) ([facility] [address]     Cough     GERD (gastroesophageal reflux disease)     Hx of blood clots     Hyperlipidemia     Hypertension     Hypertrophy of prostate     Pleuritic chest pain     Pulmonary emboli (HCC)     Tachycardia        Current medications and allergies have been reviewed    Social and family history unchanged from initial consult      REVIEW OF SYSTEMS:    CONSTITUTIONAL:  negative for fevers, chills, diaphoresis, activity change, appetite change, night sweats and unexpected weight change. HEENT:  Negative for hearing loss, sinus pressure, nasal congestion, epistaxis, snoring  RESPIRATORY: Positive occasional intermittent productive cough, negative wheeze, positive shortness of breath with exertion  CARDIOVASCULAR:  Negative for chest pain, palpitations, exertional chest pressure/discomfort, edema, syncope  GASTROINTESTINAL: Negative for nausea, vomiting, diarrhea, constipation, blood in stool and abdominal pain  GENITOURINARY:  Negative for frequency, dysuria and hematuria  HEMATOLOGIC/LYMPHATIC:  Negative for easy bruising, bleeding and lymphadenopathy  ALLERGIC/IMMUNOLOGIC:  Negative for recurrent infections, angioedema, anaphylaxis and drug reaction  MUSCULOSKELETAL:  Negative for pain, joint swelling, decreased range of motion and muscle weakness  NEURO: Negative for headache, AMS, decrease sensations  SKIN: Negative for rashes or lesions      Physical Exam:  There were no vitals taken for this visit. There is no height or weight on file to calculate BMI. Constitutional:  He appears well developed and well-nourished. Neck:  Supple, no palpable lymphadenopathy, no JVD  Cardiovascular:  S1, S2 Normal, Regular rhythm, no murmurs or gallops, no pericardial  rubs. Pulmonary:  Bilateral breath sounds clear and equal to auscultation, good air movement, no use of accessory muscles to support respiratory effort. No forced expiratory wheeze, no rales, no rhonchi, no cough noted during assessment. O2 sat at rest on room air is 85%  Abdomen: No epigastric pain, no distention, + bowel sounds   Extremities: No edema, no calf tenderness, no clubbing of digits  Neurologic:  Awake and alert, no focal deficits  Skin: warm and dry    Radiology:   11/11/2021 CT lung screening  1. No acute cardiopulmonary disease.    2. Small loculated left pleural effusion with adjacent pleural thickening and   scarring with evidence of left lung volume loss. 3. Severe emphysema. 4. Stable right base nodule measuring 13 x 9 mm.  No new nodules. 5. Small hiatal hernia with patulous esophagus. PFT:   11/11/2021  FVC 81% predicted/82% predicted, FEV1 67% predicted/70% predicted, FEF 25-75% 45% predicted/50% predicted , TLC 78% predicted, RV 92% predicted, DLCO 62% predicted  Following administration of bronchodilator there was no significant response to bronchodilator. Overall testing indicates moderate obstructive disease with minimal restrictive component, moderate diffusion defect    ASSESSMENT/PLAN:  1. Pulmonary emphysema, unspecified emphysema type ([address]    2. SOBOE (shortness of breath on exertion)    3. Lung nodule    4. Personal history of tobacco use        -Start Symbicort 2 puffs twice a day encouraged use with spacer, prescription sent to pharmacy  -Continue albuterol rescue inhaler use with spacer as needed 2 puffs every 4-6 hours, denies need for refill at this time  -Repeat pulmonary function test in 1 year, 11/20/2022  -May need Covid testing prior to pulmonary function test depending upon hospital policy at that time  -Repeat CT lung screening, 11/20/2022  -Smoking cessation education and recommendations given, offered [place] program declines at this time  --While he has been vaccinated at this time for COVID-19, I strongly recommend that he continue Coronavirus precaution including: Wearing mask when in public and when in contact with persons who have not been immunized, social distancing when needing to be in public, handwashing practice, wiping items touched in public such as gas pumps, door handles, shopping carts, etc.  --Recommend yearly flu vaccination  --Recommend Covid 19 booster when available  --Recommend Pneumovax vaccination  --Have discussed signs and symptoms of COPD exacerbation and why it is important to monitor for bronchial infections.   Instructed to call office should he develop signs of COPD exacerbation/bronchial infection  --I will continue to follow in pulmonary clinic      Return in about 6 months (around 6/6/2022). This dictation was performed with a verbal recognition program and it was checked for errors. It is possible that there are still dictated errors within this office note. Any errors should be brought immediately to my attention for correction. All efforts were made to ensure that this office note is accurate.

## 2021-12-31 PROBLEM — R06.02 SOBOE (SHORTNESS OF BREATH ON EXERTION): Status: ACTIVE | Noted: 2021-12-31

## 2021-12-31 PROBLEM — Z87.891 PERSONAL HISTORY OF TOBACCO USE: Status: ACTIVE | Noted: 2021-12-31

## 2021-12-31 PROBLEM — J43.9 PULMONARY EMPHYSEMA (HCC): Status: ACTIVE | Noted: 2021-12-31

## 2022-03-09 DIAGNOSIS — I10 ESSENTIAL HYPERTENSION: ICD-10-CM

## 2022-03-10 RX ORDER — METOPROLOL SUCCINATE 50 MG/1
TABLET, EXTENDED RELEASE ORAL
Qty: 90 TABLET | Refills: 0 | Status: SHIPPED | OUTPATIENT
Start: 2022-03-10 | End: 2022-04-11 | Stop reason: SDUPTHER

## 2022-03-10 RX ORDER — LISINOPRIL 20 MG/1
20 TABLET ORAL 2 TIMES DAILY
Qty: 180 TABLET | Refills: 0 | Status: SHIPPED | OUTPATIENT
Start: 2022-03-10 | End: 2022-04-11 | Stop reason: SDUPTHER

## 2022-03-10 RX ORDER — AMLODIPINE BESYLATE 10 MG/1
TABLET ORAL
Qty: 90 TABLET | Refills: 0 | Status: SHIPPED | OUTPATIENT
Start: 2022-03-10 | End: 2022-04-11 | Stop reason: SDUPTHER

## 2022-04-11 ENCOUNTER — OFFICE VISIT (OUTPATIENT)
Dept: FAMILY MEDICINE CLINIC | Age: 72
End: 2022-04-11
Payer: COMMERCIAL

## 2022-04-11 VITALS
SYSTOLIC BLOOD PRESSURE: 112 MMHG | HEART RATE: 78 BPM | BODY MASS INDEX: 22.12 KG/M2 | DIASTOLIC BLOOD PRESSURE: 64 MMHG | RESPIRATION RATE: 18 BRPM | OXYGEN SATURATION: 96 % | TEMPERATURE: 97.2 F | WEIGHT: 152 LBS

## 2022-04-11 DIAGNOSIS — Z72.0 TOBACCO ABUSE: ICD-10-CM

## 2022-04-11 DIAGNOSIS — E78.2 MIXED HYPERLIPIDEMIA: ICD-10-CM

## 2022-04-11 DIAGNOSIS — R91.1 LUNG NODULE: ICD-10-CM

## 2022-04-11 DIAGNOSIS — B96.89 ACUTE BACTERIAL SINUSITIS: ICD-10-CM

## 2022-04-11 DIAGNOSIS — J01.90 ACUTE BACTERIAL SINUSITIS: ICD-10-CM

## 2022-04-11 DIAGNOSIS — J43.9 PULMONARY EMPHYSEMA, UNSPECIFIED EMPHYSEMA TYPE (HCC): ICD-10-CM

## 2022-04-11 DIAGNOSIS — I10 ESSENTIAL HYPERTENSION: Primary | ICD-10-CM

## 2022-04-11 DIAGNOSIS — K21.9 GASTROESOPHAGEAL REFLUX DISEASE WITHOUT ESOPHAGITIS: ICD-10-CM

## 2022-04-11 PROCEDURE — 99214 OFFICE O/P EST MOD 30 MIN: CPT | Performed by: NURSE PRACTITIONER

## 2022-04-11 RX ORDER — AMOXICILLIN 500 MG/1
500 CAPSULE ORAL 2 TIMES DAILY
Qty: 14 CAPSULE | Refills: 0 | Status: SHIPPED | OUTPATIENT
Start: 2022-04-11 | End: 2022-04-18

## 2022-04-11 RX ORDER — ATORVASTATIN CALCIUM 80 MG/1
TABLET, FILM COATED ORAL
Qty: 90 TABLET | Refills: 3 | Status: SHIPPED | OUTPATIENT
Start: 2022-04-11

## 2022-04-11 RX ORDER — LISINOPRIL 20 MG/1
20 TABLET ORAL 2 TIMES DAILY
Qty: 180 TABLET | Refills: 1 | Status: SHIPPED | OUTPATIENT
Start: 2022-04-11 | End: 2022-08-31

## 2022-04-11 RX ORDER — FLUTICASONE PROPIONATE 50 MCG
1 SPRAY, SUSPENSION (ML) NASAL DAILY
Qty: 16 G | Refills: 0 | Status: SHIPPED | OUTPATIENT
Start: 2022-04-11

## 2022-04-11 RX ORDER — METOPROLOL SUCCINATE 50 MG/1
TABLET, EXTENDED RELEASE ORAL
Qty: 90 TABLET | Refills: 1 | Status: SHIPPED | OUTPATIENT
Start: 2022-04-11

## 2022-04-11 RX ORDER — PANTOPRAZOLE SODIUM 40 MG/1
TABLET, DELAYED RELEASE ORAL
Qty: 90 TABLET | Refills: 3 | Status: SHIPPED | OUTPATIENT
Start: 2022-04-11

## 2022-04-11 RX ORDER — NICOTINE 21 MG/24HR
1 PATCH, TRANSDERMAL 24 HOURS TRANSDERMAL EVERY 24 HOURS
Qty: 28 PATCH | Refills: 3 | Status: SHIPPED | OUTPATIENT
Start: 2022-04-11 | End: 2022-08-31 | Stop reason: SDUPTHER

## 2022-04-11 RX ORDER — AMLODIPINE BESYLATE 10 MG/1
TABLET ORAL
Qty: 90 TABLET | Refills: 1 | Status: SHIPPED | OUTPATIENT
Start: 2022-04-11 | End: 2022-08-31 | Stop reason: ALTCHOICE

## 2022-04-11 ASSESSMENT — PATIENT HEALTH QUESTIONNAIRE - PHQ9
SUM OF ALL RESPONSES TO PHQ QUESTIONS 1-9: 0
SUM OF ALL RESPONSES TO PHQ QUESTIONS 1-9: 0
2. FEELING DOWN, DEPRESSED OR HOPELESS: 0
SUM OF ALL RESPONSES TO PHQ QUESTIONS 1-9: 0
1. LITTLE INTEREST OR PLEASURE IN DOING THINGS: 0
SUM OF ALL RESPONSES TO PHQ9 QUESTIONS 1 & 2: 0
SUM OF ALL RESPONSES TO PHQ QUESTIONS 1-9: 0

## 2022-04-11 ASSESSMENT — ENCOUNTER SYMPTOMS
COUGH: 0
VOMITING: 0
SINUS PAIN: 1
CHEST TIGHTNESS: 0
CONSTIPATION: 0
SHORTNESS OF BREATH: 0
NAUSEA: 0
RHINORRHEA: 1
ABDOMINAL PAIN: 0
SINUS PRESSURE: 1
SORE THROAT: 0
DIARRHEA: 0
WHEEZING: 0

## 2022-04-11 NOTE — PROGRESS NOTES
SUBJECTIVE:    Thuan Chu  1950  66 y.o.  male      Chief Complaint   Patient presents with    Follow-up     6 month follow up. He thinks he may havea sinus infection. Nasal congestion and drainage . Has had symptoms for about 2 weeks. HPI     No Known Allergies    Current Outpatient Medications   Medication Sig Dispense Refill    amLODIPine (NORVASC) 10 MG tablet TAKE 1 TABLET BY MOUTH EVERY DAY 90 tablet 1    atorvastatin (LIPITOR) 80 MG tablet TAKE 1 TABLET BY MOUTH EVERY DAY 90 tablet 3    lisinopril (PRINIVIL;ZESTRIL) 20 MG tablet Take 1 tablet by mouth 2 times daily 180 tablet 1    metoprolol succinate (TOPROL XL) 50 MG extended release tablet TAKE 1 TABLET BY MOUTH EVERY DAY 90 tablet 1    nicotine (NICODERM CQ) 14 MG/24HR Place 1 patch onto the skin every 24 hours 28 patch 3    pantoprazole (PROTONIX) 40 MG tablet TAKE 1 TABLET BY MOUTH EVERY DAY 90 tablet 3    amoxicillin (AMOXIL) 500 MG capsule Take 1 capsule by mouth 2 times daily for 7 days 14 capsule 0    fluticasone (FLONASE) 50 MCG/ACT nasal spray 1 spray by Each Nostril route daily 16 g 0    Multiple Vitamin (MULTI VITAMIN DAILY PO) Take by mouth      aspirin 81 MG tablet Take 81 mg by mouth daily. No current facility-administered medications for this visit.           Past Medical History:   Diagnosis Date    Cobian esophagus     Chronic rhinitis     COPD (chronic obstructive pulmonary disease) (HCC)     Cough     GERD (gastroesophageal reflux disease)     Hx of blood clots     Hyperlipidemia     Hypertension     Hypertrophy of prostate     Pleuritic chest pain     Pulmonary emboli (HCC)     Tachycardia      Past Surgical History:   Procedure Laterality Date    COLONOSCOPY  08/19/2016    10 small colon polyps, int hem    COLONOSCOPY  09/06/2017    hems grade 2    COLONOSCOPY  01/18/2021    Total colonoscopy with polypectomies by Dr. Dylon Gr in [facility] N/A 1/18/2021    COLONOSCOPY file    Marital Status: Not on file   Intimate Partner Violence:     Fear of Current or Ex-Partner: Not on file    Emotionally Abused: Not on file    Physically Abused: Not on file    Sexually Abused: Not on file   Housing Stability:     Unable to Pay for Housing in the Last Year: Not on file    Number of [place] in the Last Year: Not on file    Unstable Housing in the Last Year: Not on file     Symptom onset two weeks ago. Complains of rhinorrhea, sinus pain/pressure, ear fullness, postnasal drainage, fatigue. Denies fevers, cough. He has not taken anything for his symptoms. Symptoms gradually improving. Essential hypertension  Not checking blood pressure at home. Patient denies any exertional chest pain, dyspnea, palpitations, syncope, orthopnea, edema or paroxysmal nocturnal dyspnea. Gastroesophageal reflux disease without esophagitis  No issues with reflux. Remains on protonix     Pulmonary emphysema ([address]  Went to pulmonology. He was prescribed symbicort and has not been taking. He is worried about the side effects. Impression   1. No acute cardiopulmonary disease. 2. Small loculated left pleural effusion with adjacent pleural thickening and   scarring with evidence of left lung volume loss. 3. Severe emphysema. 4. Stable right base nodule measuring 13 x 9 mm.  No new nodules. 5. Small hiatal hernia with patulous esophagus.       LUNG RADS:   Per ACR Lung-RADS Version 1.1       Category 2, Benign appearance or behavior.       Management:  Continue annual lung screening with LDCT in 12 months. Tobacco abuse  Continues to smoke. Will use nicotine patches during the day and then remove once he is off work. Review of Systems   Constitutional: Negative for appetite change, chills, fatigue, fever and unexpected weight change. HENT: Positive for congestion, postnasal drip, rhinorrhea, sinus pressure and sinus pain. Negative for sore throat.     Respiratory: Negative for cough, chest tightness, shortness of breath and wheezing. Cardiovascular: Negative for chest pain, palpitations and leg swelling. Gastrointestinal: Negative for abdominal pain, constipation, diarrhea, nausea and vomiting. Musculoskeletal: Negative for arthralgias. Neurological: Negative for weakness, numbness and headaches. Hematological: Negative for adenopathy. OBJECTIVE:    /64 (Site: Left Upper Arm, Position: Sitting, Cuff Size: Medium Adult)   Pulse 78   Temp 97.2 °F (36.2 °C) (Infrared)   Resp 18   Wt 152 lb (68.9 kg)   SpO2 96%   BMI 22.12 kg/m²     Physical Exam  Constitutional:       Appearance: Normal appearance. He is well-developed. HENT:      Head: Normocephalic and atraumatic. Right Ear: Hearing, tympanic membrane and ear canal normal.      Left Ear: Hearing, tympanic membrane and ear canal normal.      Nose:      Right Sinus: Maxillary sinus tenderness and frontal sinus tenderness present. Left Sinus: Maxillary sinus tenderness and frontal sinus tenderness present. Mouth/Throat:      Mouth: Mucous membranes are moist.      Pharynx: Oropharynx is clear. Uvula midline. Neck:      Vascular: No carotid bruit or JVD. Cardiovascular:      Rate and Rhythm: Normal rate and regular rhythm. Heart sounds: Normal heart sounds. No murmur heard. No friction rub. No gallop. Pulmonary:      Effort: Pulmonary effort is normal. No respiratory distress. Breath sounds: Normal breath sounds. No wheezing, rhonchi or rales. Abdominal:      Palpations: Abdomen is soft. Musculoskeletal:         General: Normal range of motion. Cervical back: Normal range of motion and neck supple. Skin:     General: Skin is warm and dry. Neurological:      General: No focal deficit present. Mental Status: He is alert and oriented to person, place, and time.    Psychiatric:         Mood and Affect: Mood normal.         Behavior: Behavior normal. Behavior is cooperative. Thought Content: Thought content normal.         ASSESSMENT/PLAN:    1. Essential hypertension  Monitor BP. Continue current medication  - amLODIPine (NORVASC) 10 MG tablet; TAKE 1 TABLET BY MOUTH EVERY DAY  Dispense: 90 tablet; Refill: 1  - lisinopril (PRINIVIL;ZESTRIL) 20 MG tablet; Take 1 tablet by mouth 2 times daily  Dispense: 180 tablet; Refill: 1  - metoprolol succinate (TOPROL XL) 50 MG extended release tablet; TAKE 1 TABLET BY MOUTH EVERY DAY  Dispense: 90 tablet; Refill: 1    2. Mixed hyperlipidemia  refilled  - atorvastatin (LIPITOR) 80 MG tablet; TAKE 1 TABLET BY MOUTH EVERY DAY  Dispense: 90 tablet; Refill: 3    3. Gastroesophageal reflux disease without esophagitis  Avoid exacerbating foods  - pantoprazole (PROTONIX) 40 MG tablet; TAKE 1 TABLET BY MOUTH EVERY DAY  Dispense: 90 tablet; Refill: 3    4. Acute bacterial sinusitis  Antibiotic as prescribed. Plenty of fluids and rest. Follow up if not improving  - amoxicillin (AMOXIL) 500 MG capsule; Take 1 capsule by mouth 2 times daily for 7 days  Dispense: 14 capsule; Refill: 0  - fluticasone (FLONASE) 50 MCG/ACT nasal spray; 1 spray by Each Nostril route daily  Dispense: 16 g; Refill: 0    5. Lung nodule  Continue annual lung cancer screening    6. Pulmonary emphysema, unspecified emphysema type ([facility] [address]  Counseled on smoking cessation    7. Tobacco abuse  Counseled on smoking cessation               Return in about 6 months (around 10/11/2022), or if symptoms worsen or fail to improve.       (Please note that portions of this note may have been completed with a voice recognition program. Efforts were made to edit the dictations but occasionally words aremis-transcribed.)

## 2022-04-11 NOTE — ASSESSMENT & PLAN NOTE
Went to pulmonology. He was prescribed symbicort and has not been taking. He is worried about the side effects. Impression   1. No acute cardiopulmonary disease. 2. Small loculated left pleural effusion with adjacent pleural thickening and   scarring with evidence of left lung volume loss. 3. Severe emphysema. 4. Stable right base nodule measuring 13 x 9 mm.  No new nodules. 5. Small hiatal hernia with patulous esophagus.       LUNG RADS:   Per ACR Lung-RADS Version 1.1       Category 2, Benign appearance or behavior.       Management:  Continue annual lung screening with LDCT in 12 months.

## 2022-04-11 NOTE — ASSESSMENT & PLAN NOTE
Not checking blood pressure at home. Patient denies any exertional chest pain, dyspnea, palpitations, syncope, orthopnea, edema or paroxysmal nocturnal dyspnea.

## 2022-08-31 ENCOUNTER — OFFICE VISIT (OUTPATIENT)
Dept: FAMILY MEDICINE CLINIC | Age: 72
End: 2022-08-31
Payer: COMMERCIAL

## 2022-08-31 VITALS
DIASTOLIC BLOOD PRESSURE: 66 MMHG | WEIGHT: 145.2 LBS | BODY MASS INDEX: 21.13 KG/M2 | RESPIRATION RATE: 18 BRPM | HEART RATE: 90 BPM | TEMPERATURE: 97.8 F | SYSTOLIC BLOOD PRESSURE: 122 MMHG | OXYGEN SATURATION: 96 %

## 2022-08-31 DIAGNOSIS — I10 ESSENTIAL HYPERTENSION: ICD-10-CM

## 2022-08-31 DIAGNOSIS — Z87.891 PERSONAL HISTORY OF TOBACCO USE: ICD-10-CM

## 2022-08-31 PROCEDURE — 1123F ACP DISCUSS/DSCN MKR DOCD: CPT | Performed by: NURSE PRACTITIONER

## 2022-08-31 PROCEDURE — 99213 OFFICE O/P EST LOW 20 MIN: CPT | Performed by: NURSE PRACTITIONER

## 2022-08-31 RX ORDER — LISINOPRIL 10 MG/1
10 TABLET ORAL DAILY
Qty: 90 TABLET | Refills: 1
Start: 2022-08-31

## 2022-08-31 RX ORDER — NICOTINE 21 MG/24HR
1 PATCH, TRANSDERMAL 24 HOURS TRANSDERMAL EVERY 24 HOURS
Qty: 28 PATCH | Refills: 3 | Status: SHIPPED | OUTPATIENT
Start: 2022-08-31

## 2022-08-31 ASSESSMENT — PATIENT HEALTH QUESTIONNAIRE - PHQ9
SUM OF ALL RESPONSES TO PHQ QUESTIONS 1-9: 0
SUM OF ALL RESPONSES TO PHQ9 QUESTIONS 1 & 2: 0
SUM OF ALL RESPONSES TO PHQ QUESTIONS 1-9: 0
2. FEELING DOWN, DEPRESSED OR HOPELESS: 0
1. LITTLE INTEREST OR PLEASURE IN DOING THINGS: 0

## 2022-08-31 ASSESSMENT — ENCOUNTER SYMPTOMS
SHORTNESS OF BREATH: 0
WHEEZING: 0
VOMITING: 0
CONSTIPATION: 0
ABDOMINAL PAIN: 0
COUGH: 0
NAUSEA: 0
DIARRHEA: 0
CHEST TIGHTNESS: 0

## 2022-08-31 NOTE — PROGRESS NOTES
SUBJECTIVE:    [facility]  1950  66 y.o.  male      Chief Complaint   Patient presents with    Other     His blood pressure is fluctuating and has kept a record . HPI      Started checking BP because he started to feel tired in the afternoon, legs were heavy. Blood pressure has been running lower--checking regularly over the last couple weeks. 90s-120s/50-60s. Previously was taking lisinopril 20 mg BID. He decreased to lisinopril 10 mg in the morning and none in the evening. Feels less tired since decreasing lisinopril. He was feeling dizzy with bending over prior to decreasing the lisinopril. BP before medication has been in 140/80s. Asking for refill on nicotine patches. Smoking half a pack. No Known Allergies    Current Outpatient Medications   Medication Sig Dispense Refill    lisinopril (PRINIVIL;ZESTRIL) 10 MG tablet Take 1 tablet by mouth daily 90 tablet 1    nicotine (NICODERM CQ) 14 MG/24HR Place 1 patch onto the skin every 24 hours 28 patch 3    atorvastatin (LIPITOR) 80 MG tablet TAKE 1 TABLET BY MOUTH EVERY DAY 90 tablet 3    metoprolol succinate (TOPROL XL) 50 MG extended release tablet TAKE 1 TABLET BY MOUTH EVERY DAY 90 tablet 1    pantoprazole (PROTONIX) 40 MG tablet TAKE 1 TABLET BY MOUTH EVERY DAY 90 tablet 3    fluticasone (FLONASE) 50 MCG/ACT nasal spray 1 spray by Each Nostril route daily 16 g 0    Multiple Vitamin (MULTI VITAMIN DAILY PO) Take by mouth      aspirin 81 MG tablet Take 81 mg by mouth daily. No current facility-administered medications for this visit.           Past Medical History:   Diagnosis Date    Cobian esophagus     Chronic rhinitis     COPD (chronic obstructive pulmonary disease) (HCC)     Cough     GERD (gastroesophageal reflux disease)     Hx of blood clots     Hyperlipidemia     Hypertension     Hypertrophy of prostate     Pleuritic chest pain     Pulmonary emboli (HCC)     Tachycardia      Past Surgical History:   Procedure Laterality Date COLONOSCOPY  08/19/2016    10 small colon polyps, int hem    COLONOSCOPY  09/06/2017    hems grade 2    COLONOSCOPY  01/18/2021    Total colonoscopy with polypectomies by Dr. Evelina Morales in [place]    COLONOSCOPY N/A 1/18/2021    COLONOSCOPY POLYPECTOMY SNARE/COLD BIOPSY performed by Ramesh Polk MD at [address], COLON, DIAGNOSTIC  01/18/2021    Esophagogastroduodenoscopy with biopsies by Dr. Evelina Morales in [address] [place] [address],  [address] ENDOSCOPY N/A 1/18/2021    EGD BIOPSY performed by Ramesh Polk MD at [address] [facility] History    Marital status:      Spouse name: None    Number of children: None    Years of education: None    Highest education level: None   Tobacco Use    Smoking status: Some Days     Packs/day: 0.50     Years: 50.00     Pack years: 25.00     Types: Cigarettes    Smokeless tobacco: Never   Vaping Use    Vaping Use: Never used   Substance and Sexual Activity    Alcohol use: Yes     Alcohol/week: 4.0 standard drinks     Types: 4 Cans of beer per week     Comment: 4 beers day     Drug use: Not Currently    Sexual activity: Yes     Partners: Female         Personal history of tobacco use  Continues to smoke half a pack a day. Uses the patches to decrease his use. Not ready to fully quit smoking        Review of Systems   Constitutional:  Positive for fatigue. Negative for appetite change, chills and unexpected weight change. Respiratory:  Negative for cough, chest tightness, shortness of breath and wheezing. Cardiovascular:  Negative for chest pain, palpitations and leg swelling. Gastrointestinal:  Negative for abdominal pain, constipation, diarrhea, nausea and vomiting. Musculoskeletal:  Negative for arthralgias. Neurological:  Negative for weakness, numbness and headaches. Hematological:  Negative for adenopathy.      OBJECTIVE:    /66 (Site: Right Upper Arm, Position: Sitting, Cuff Size: Medium Adult)   Pulse 90   Temp 97.8 °F (36.6 °C) (Infrared)   Resp 18   Wt 145 lb 3.2 oz (65.9 kg)   SpO2 96%   BMI 21.13 kg/m²     Physical Exam  Constitutional:       Appearance: Normal appearance. He is well-developed. HENT:      Head: Normocephalic and atraumatic. Right Ear: Hearing normal.      Left Ear: Hearing normal.   Neck:      Vascular: No carotid bruit or JVD. Cardiovascular:      Rate and Rhythm: Normal rate and regular rhythm. Heart sounds: Normal heart sounds. No murmur heard. No friction rub. No gallop. Pulmonary:      Effort: Pulmonary effort is normal. No respiratory distress. Breath sounds: Normal breath sounds. No wheezing, rhonchi or rales. Abdominal:      Palpations: Abdomen is soft. Musculoskeletal:         General: Normal range of motion. Cervical back: Normal range of motion and neck supple. Skin:     General: Skin is warm and dry. Neurological:      General: No focal deficit present. Mental Status: He is alert and oriented to person, place, and time. Psychiatric:         Mood and Affect: Mood normal.         Behavior: Behavior normal. Behavior is cooperative. Thought Content: Thought content normal.       ASSESSMENT/PLAN:    1. Essential hypertension  Stop amlodipine. Continue lisinopril at 10 mg daily. Monitor BP closely. follow up in three weeks  - lisinopril (PRINIVIL;ZESTRIL) 10 MG tablet; Take 1 tablet by mouth daily  Dispense: 90 tablet; Refill: 1    2. Personal history of tobacco use  Counseled on cessation. Avoid smoking while using nicotine patches             Return in about 3 weeks (around 9/21/2022).       (Please note that portions of this note may have been completed with a voice recognition program. Efforts were made to edit the dictations but occasionally words aremis-transcribed.)

## 2022-09-21 ENCOUNTER — OFFICE VISIT (OUTPATIENT)
Dept: FAMILY MEDICINE CLINIC | Age: 72
End: 2022-09-21
Payer: COMMERCIAL

## 2022-09-21 VITALS
OXYGEN SATURATION: 96 % | DIASTOLIC BLOOD PRESSURE: 76 MMHG | WEIGHT: 146.2 LBS | TEMPERATURE: 98.1 F | HEART RATE: 72 BPM | SYSTOLIC BLOOD PRESSURE: 120 MMHG | BODY MASS INDEX: 21.28 KG/M2 | RESPIRATION RATE: 18 BRPM

## 2022-09-21 DIAGNOSIS — I10 ESSENTIAL HYPERTENSION: ICD-10-CM

## 2022-09-21 DIAGNOSIS — E78.2 MIXED HYPERLIPIDEMIA: Primary | ICD-10-CM

## 2022-09-21 DIAGNOSIS — E78.2 MIXED HYPERLIPIDEMIA: ICD-10-CM

## 2022-09-21 PROCEDURE — 99213 OFFICE O/P EST LOW 20 MIN: CPT | Performed by: NURSE PRACTITIONER

## 2022-09-21 PROCEDURE — 1123F ACP DISCUSS/DSCN MKR DOCD: CPT | Performed by: NURSE PRACTITIONER

## 2022-09-21 ASSESSMENT — ENCOUNTER SYMPTOMS
DIARRHEA: 0
VOMITING: 0
COUGH: 0
WHEEZING: 0
CHEST TIGHTNESS: 0
SHORTNESS OF BREATH: 0
CONSTIPATION: 0
NAUSEA: 0
ABDOMINAL PAIN: 0

## 2022-09-21 ASSESSMENT — PATIENT HEALTH QUESTIONNAIRE - PHQ9
1. LITTLE INTEREST OR PLEASURE IN DOING THINGS: 0
SUM OF ALL RESPONSES TO PHQ QUESTIONS 1-9: 0
SUM OF ALL RESPONSES TO PHQ QUESTIONS 1-9: 0
2. FEELING DOWN, DEPRESSED OR HOPELESS: 0
SUM OF ALL RESPONSES TO PHQ9 QUESTIONS 1 & 2: 0
SUM OF ALL RESPONSES TO PHQ QUESTIONS 1-9: 0
SUM OF ALL RESPONSES TO PHQ QUESTIONS 1-9: 0

## 2022-09-21 NOTE — ASSESSMENT & PLAN NOTE
norvasc stopped three weeks ago. He continues lisinopril 10 mg and metoprolol 50 mg. After medication ranging from 06M-732O systolic. Overall he is feeling better. Less fatigued. Patient denies any exertional chest pain, dyspnea, palpitations, syncope, orthopnea, edema or paroxysmal nocturnal dyspnea.

## 2022-09-21 NOTE — PROGRESS NOTES
SUBJECTIVE:    Glorianne Linear  1950  66 y.o.  male      Chief Complaint   Patient presents with    Follow-up     Pt here for 3 week f/u -BP    Flu Vaccine     Pt declines at this time. Wants to wait until Nov     HPI    No Known Allergies    Current Outpatient Medications   Medication Sig Dispense Refill    lisinopril (PRINIVIL;ZESTRIL) 10 MG tablet Take 1 tablet by mouth daily 90 tablet 1    nicotine (NICODERM CQ) 14 MG/24HR Place 1 patch onto the skin every 24 hours 28 patch 3    atorvastatin (LIPITOR) 80 MG tablet TAKE 1 TABLET BY MOUTH EVERY DAY 90 tablet 3    metoprolol succinate (TOPROL XL) 50 MG extended release tablet TAKE 1 TABLET BY MOUTH EVERY DAY 90 tablet 1    pantoprazole (PROTONIX) 40 MG tablet TAKE 1 TABLET BY MOUTH EVERY DAY 90 tablet 3    Multiple Vitamin (MULTI VITAMIN DAILY PO) Take by mouth      aspirin 81 MG tablet Take 81 mg by mouth daily. fluticasone (FLONASE) 50 MCG/ACT nasal spray 1 spray by Each Nostril route daily (Patient not taking: Reported on 9/21/2022) 16 g 0     No current facility-administered medications for this visit.           Past Medical History:   Diagnosis Date    Cobian esophagus     Chronic rhinitis     COPD (chronic obstructive pulmonary disease) (HCC)     Cough     GERD (gastroesophageal reflux disease)     Hx of blood clots     Hyperlipidemia     Hypertension     Hypertrophy of prostate     Pleuritic chest pain     Pulmonary emboli (HCC)     Tachycardia      Past Surgical History:   Procedure Laterality Date    COLONOSCOPY  08/19/2016    10 small colon polyps, int hem    COLONOSCOPY  09/06/2017    hems grade 2    COLONOSCOPY  01/18/2021    Total colonoscopy with polypectomies by Dr. Cora Guallpa in [place]    COLONOSCOPY N/A 1/18/2021    COLONOSCOPY POLYPECTOMY SNARE/COLD BIOPSY performed by Robyn Cazares MD at [address], COLON, DIAGNOSTIC  01/18/2021    Esophagogastroduodenoscopy with biopsies by Dr. Cora Guallpa in [place] [address] LITHOTRIPSY      UPPER GASTROINTESTINAL ENDOSCOPY N/A 1/18/2021    EGD BIOPSY performed by Silvia Boyle MD at [address] History     Socioeconomic History    Marital status:      Spouse name: None    Number of children: None    Years of education: None    Highest education level: None   Tobacco Use    Smoking status: Some Days     Packs/day: 0.50     Years: 50.00     Pack years: 25.00     Types: Cigarettes    Smokeless tobacco: Never   Vaping Use    Vaping Use: Never used   Substance and Sexual Activity    Alcohol use: Yes     Alcohol/week: 4.0 standard drinks     Types: 4 Cans of beer per week     Comment: 4 beers day     Drug use: Not Currently    Sexual activity: Yes     Partners: Female         Essential hypertension  norvasc stopped three weeks ago. He continues lisinopril 10 mg and metoprolol 50 mg. After medication ranging from 66B-784O systolic. Overall he is feeling better. Less fatigued. Patient denies any exertional chest pain, dyspnea, palpitations, syncope, orthopnea, edema or paroxysmal nocturnal dyspnea. Review of Systems   Constitutional:  Negative for appetite change, chills, fatigue and unexpected weight change. Respiratory:  Negative for cough, chest tightness, shortness of breath and wheezing. Cardiovascular:  Negative for chest pain, palpitations and leg swelling. Gastrointestinal:  Negative for abdominal pain, constipation, diarrhea, nausea and vomiting. Musculoskeletal:  Negative for arthralgias. Neurological:  Negative for weakness, numbness and headaches. Hematological:  Negative for adenopathy. OBJECTIVE:    /76 (Site: Left Upper Arm, Position: Sitting, Cuff Size: Medium Adult)   Pulse 72   Temp 98.1 °F (36.7 °C)   Resp 18   Wt 146 lb 3.2 oz (66.3 kg)   SpO2 96%   BMI 21.28 kg/m²     Physical Exam  Constitutional:       Appearance: Normal appearance. He is well-developed.    HENT:      Head: Normocephalic and atraumatic. Right Ear: Hearing normal.      Left Ear: Hearing normal.   Neck:      Vascular: No carotid bruit or JVD. Cardiovascular:      Rate and Rhythm: Normal rate and regular rhythm. Heart sounds: Normal heart sounds. No murmur heard. No friction rub. No gallop. Pulmonary:      Effort: Pulmonary effort is normal. No respiratory distress. Breath sounds: Normal breath sounds. No wheezing, rhonchi or rales. Abdominal:      Palpations: Abdomen is soft. Musculoskeletal:         General: Normal range of motion. Cervical back: Normal range of motion and neck supple. Skin:     General: Skin is warm and dry. Neurological:      General: No focal deficit present. Mental Status: He is alert and oriented to person, place, and time. Psychiatric:         Mood and Affect: Mood normal.         Behavior: Behavior normal. Behavior is cooperative. Thought Content: Thought content normal.       ASSESSMENT/PLAN:    1. Essential hypertension  BP and symptoms have improved. Continue lisinopril 10 mg and metoprolol 50 mg. Monitor BP. Follow up in 6 months--earlier PRN  - CBC with Auto Differential; Future  - Comprehensive Metabolic Panel; Future    2. Mixed hyperlipidemia  - Lipid Panel; Future             Return in about 6 months (around 3/21/2023).       (Please note that portions of this note may have been completed with a voice recognition program. Efforts were made to edit the dictations but occasionally words aremis-transcribed.)

## 2022-09-22 LAB
A/G RATIO: 1.7 (ref 1.1–2.2)
ALBUMIN SERPL-MCNC: 4.3 G/DL (ref 3.4–5)
ALP BLD-CCNC: 77 U/L (ref 40–129)
ALT SERPL-CCNC: 19 U/L (ref 10–40)
ANION GAP SERPL CALCULATED.3IONS-SCNC: 12 MMOL/L (ref 3–16)
AST SERPL-CCNC: 21 U/L (ref 15–37)
BASOPHILS ABSOLUTE: 0.1 K/UL (ref 0–0.2)
BASOPHILS RELATIVE PERCENT: 1.1 %
BILIRUB SERPL-MCNC: 0.3 MG/DL (ref 0–1)
BUN BLDV-MCNC: 15 MG/DL (ref 7–20)
CALCIUM SERPL-MCNC: 9.1 MG/DL (ref 8.3–10.6)
CHLORIDE BLD-SCNC: 103 MMOL/L (ref 99–110)
CHOLESTEROL, TOTAL: 140 MG/DL (ref 0–199)
CO2: 26 MMOL/L (ref 21–32)
CREAT SERPL-MCNC: 1 MG/DL (ref 0.8–1.3)
EOSINOPHILS ABSOLUTE: 0.2 K/UL (ref 0–0.6)
EOSINOPHILS RELATIVE PERCENT: 3.1 %
GFR AFRICAN AMERICAN: >60
GFR NON-AFRICAN AMERICAN: >60
GLUCOSE BLD-MCNC: 90 MG/DL (ref 70–99)
HCT VFR BLD CALC: 41.3 % (ref 40.5–52.5)
HDLC SERPL-MCNC: 67 MG/DL (ref 40–60)
HEMOGLOBIN: 14.1 G/DL (ref 13.5–17.5)
LDL CHOLESTEROL CALCULATED: 55 MG/DL
LYMPHOCYTES ABSOLUTE: 1.5 K/UL (ref 1–5.1)
LYMPHOCYTES RELATIVE PERCENT: 19.5 %
MCH RBC QN AUTO: 32.3 PG (ref 26–34)
MCHC RBC AUTO-ENTMCNC: 34.1 G/DL (ref 31–36)
MCV RBC AUTO: 94.5 FL (ref 80–100)
MONOCYTES ABSOLUTE: 0.8 K/UL (ref 0–1.3)
MONOCYTES RELATIVE PERCENT: 11 %
NEUTROPHILS ABSOLUTE: 5 K/UL (ref 1.7–7.7)
NEUTROPHILS RELATIVE PERCENT: 65.3 %
PDW BLD-RTO: 14.2 % (ref 12.4–15.4)
PLATELET # BLD: 257 K/UL (ref 135–450)
PMV BLD AUTO: 8.6 FL (ref 5–10.5)
POTASSIUM SERPL-SCNC: 5.1 MMOL/L (ref 3.5–5.1)
RBC # BLD: 4.37 M/UL (ref 4.2–5.9)
SODIUM BLD-SCNC: 141 MMOL/L (ref 136–145)
TOTAL PROTEIN: 6.9 G/DL (ref 6.4–8.2)
TRIGL SERPL-MCNC: 91 MG/DL (ref 0–150)
VLDLC SERPL CALC-MCNC: 18 MG/DL
WBC # BLD: 7.7 K/UL (ref 4–11)

## 2022-11-09 DIAGNOSIS — I10 ESSENTIAL HYPERTENSION: ICD-10-CM

## 2022-11-09 RX ORDER — METOPROLOL SUCCINATE 50 MG/1
TABLET, EXTENDED RELEASE ORAL
Qty: 90 TABLET | Refills: 1 | Status: SHIPPED | OUTPATIENT
Start: 2022-11-09

## 2022-11-09 RX ORDER — AMLODIPINE BESYLATE 10 MG/1
TABLET ORAL
Qty: 90 TABLET | Refills: 1 | OUTPATIENT
Start: 2022-11-09

## 2022-11-09 RX ORDER — LISINOPRIL 20 MG/1
TABLET ORAL
Qty: 180 TABLET | Refills: 1 | OUTPATIENT
Start: 2022-11-09

## 2022-11-16 ENCOUNTER — TELEPHONE (OUTPATIENT)
Dept: FAMILY MEDICINE CLINIC | Age: 72
End: 2022-11-16

## 2022-11-16 DIAGNOSIS — I10 ESSENTIAL HYPERTENSION: ICD-10-CM

## 2022-11-17 RX ORDER — LISINOPRIL 10 MG/1
10 TABLET ORAL DAILY
Qty: 90 TABLET | Refills: 1 | Status: SHIPPED | OUTPATIENT
Start: 2022-11-17

## 2023-03-22 ENCOUNTER — OFFICE VISIT (OUTPATIENT)
Dept: FAMILY MEDICINE CLINIC | Age: 73
End: 2023-03-22
Payer: COMMERCIAL

## 2023-03-22 VITALS
HEART RATE: 85 BPM | WEIGHT: 151 LBS | SYSTOLIC BLOOD PRESSURE: 126 MMHG | OXYGEN SATURATION: 98 % | DIASTOLIC BLOOD PRESSURE: 72 MMHG | TEMPERATURE: 97.4 F | BODY MASS INDEX: 21.98 KG/M2 | RESPIRATION RATE: 16 BRPM

## 2023-03-22 DIAGNOSIS — J43.9 PULMONARY EMPHYSEMA, UNSPECIFIED EMPHYSEMA TYPE (HCC): ICD-10-CM

## 2023-03-22 DIAGNOSIS — J30.2 SEASONAL ALLERGIC RHINITIS, UNSPECIFIED TRIGGER: ICD-10-CM

## 2023-03-22 DIAGNOSIS — Z87.891 PERSONAL HISTORY OF TOBACCO USE: ICD-10-CM

## 2023-03-22 DIAGNOSIS — Z72.0 TOBACCO ABUSE: ICD-10-CM

## 2023-03-22 DIAGNOSIS — E78.2 MIXED HYPERLIPIDEMIA: ICD-10-CM

## 2023-03-22 DIAGNOSIS — K21.9 GASTROESOPHAGEAL REFLUX DISEASE WITHOUT ESOPHAGITIS: ICD-10-CM

## 2023-03-22 DIAGNOSIS — I10 ESSENTIAL HYPERTENSION: Primary | ICD-10-CM

## 2023-03-22 PROCEDURE — 99214 OFFICE O/P EST MOD 30 MIN: CPT | Performed by: NURSE PRACTITIONER

## 2023-03-22 PROCEDURE — 1123F ACP DISCUSS/DSCN MKR DOCD: CPT | Performed by: NURSE PRACTITIONER

## 2023-03-22 PROCEDURE — G0296 VISIT TO DETERM LDCT ELIG: HCPCS | Performed by: NURSE PRACTITIONER

## 2023-03-22 PROCEDURE — 3078F DIAST BP <80 MM HG: CPT | Performed by: NURSE PRACTITIONER

## 2023-03-22 PROCEDURE — 3074F SYST BP LT 130 MM HG: CPT | Performed by: NURSE PRACTITIONER

## 2023-03-22 RX ORDER — PANTOPRAZOLE SODIUM 40 MG/1
TABLET, DELAYED RELEASE ORAL
Qty: 90 TABLET | Refills: 3 | Status: SHIPPED | OUTPATIENT
Start: 2023-03-22

## 2023-03-22 RX ORDER — NICOTINE 21 MG/24HR
1 PATCH, TRANSDERMAL 24 HOURS TRANSDERMAL EVERY 24 HOURS
Qty: 28 PATCH | Refills: 3 | Status: SHIPPED | OUTPATIENT
Start: 2023-03-22

## 2023-03-22 RX ORDER — ATORVASTATIN CALCIUM 80 MG/1
TABLET, FILM COATED ORAL
Qty: 90 TABLET | Refills: 3 | Status: SHIPPED | OUTPATIENT
Start: 2023-03-22

## 2023-03-22 SDOH — ECONOMIC STABILITY: INCOME INSECURITY: HOW HARD IS IT FOR YOU TO PAY FOR THE VERY BASICS LIKE FOOD, HOUSING, MEDICAL CARE, AND HEATING?: NOT HARD AT ALL

## 2023-03-22 SDOH — ECONOMIC STABILITY: FOOD INSECURITY: WITHIN THE PAST 12 MONTHS, YOU WORRIED THAT YOUR FOOD WOULD RUN OUT BEFORE YOU GOT MONEY TO BUY MORE.: NEVER TRUE

## 2023-03-22 SDOH — ECONOMIC STABILITY: HOUSING INSECURITY
IN THE LAST 12 MONTHS, WAS THERE A TIME WHEN YOU DID NOT HAVE A STEADY PLACE TO SLEEP OR SLEPT IN A SHELTER (INCLUDING NOW)?: NO

## 2023-03-22 SDOH — ECONOMIC STABILITY: FOOD INSECURITY: WITHIN THE PAST 12 MONTHS, THE FOOD YOU BOUGHT JUST DIDN'T LAST AND YOU DIDN'T HAVE MONEY TO GET MORE.: NEVER TRUE

## 2023-03-22 ASSESSMENT — ENCOUNTER SYMPTOMS
COUGH: 0
WHEEZING: 0
CONSTIPATION: 0
ABDOMINAL PAIN: 0
DIARRHEA: 0
NAUSEA: 0
CHEST TIGHTNESS: 0
SHORTNESS OF BREATH: 0
VOMITING: 0
RHINORRHEA: 1

## 2023-03-22 ASSESSMENT — PATIENT HEALTH QUESTIONNAIRE - PHQ9
SUM OF ALL RESPONSES TO PHQ QUESTIONS 1-9: 0
SUM OF ALL RESPONSES TO PHQ QUESTIONS 1-9: 0
1. LITTLE INTEREST OR PLEASURE IN DOING THINGS: 0
SUM OF ALL RESPONSES TO PHQ QUESTIONS 1-9: 0
2. FEELING DOWN, DEPRESSED OR HOPELESS: 0
SUM OF ALL RESPONSES TO PHQ9 QUESTIONS 1 & 2: 0
SUM OF ALL RESPONSES TO PHQ QUESTIONS 1-9: 0

## 2023-03-22 NOTE — PROGRESS NOTES
diagnosis and treatment if screening scan is positive. In addition, the patient was counseled regarding the importance of remaining smoke free and/or total smoking cessation.     Also reviewed the following if the patient has Medicare that as of February 10, 2022, Medicare only covers LDCT screening in patients aged 51-72 with at least a 20 pack-year smoking history who currently smoke or have quit in the last 15 years

## 2023-03-22 NOTE — ASSESSMENT & PLAN NOTE
----- Message from Kiel Anaya sent at 10/1/2020  4:24 PM EDT -----  Subject: Message to Provider    QUESTIONS  Information for Provider? Patient was referred to a Dr. Saint Clair for   Chiropractic treatment he has been unable to reach their office at the #   [phone]. Please contact the patient with updated information.   ---------------------------------------------------------------------------  --------------  CALL BACK INFO  What is the best way for the office to contact you? OK to leave message on   voicemail  Preferred Call Back Phone Number? [phone]  ---------------------------------------------------------------------------  --------------  SCRIPT ANSWERS  Relationship to Patient?  Self Diet 'terrible'. Remains on lipitor.

## 2023-03-22 NOTE — PATIENT INSTRUCTIONS
decide your lung cancer risk. What are the risks of screening? CT screening for lung cancer isn't perfect. It can show an abnormal result when it turns out there wasn't any cancer. This is called a false-positive result. This means you may need more tests to make sure you don't have cancer. These tests can be harmful and cause a lot of worry. These tests may include more CT scans and invasive testing like a lung biopsy. In a biopsy, the doctor takes a sample of tissue from inside your lung so it can be looked at under a microscope. A biopsy is the only way to tell if you have lung cancer. If the biopsy finds cancer, you and your doctor will have to decide how or whether to treat it. Some lung cancers found on CT scans are harmless and would not have caused a problem if they had not been found through screening. But because doctors can't tell which ones will turn out to be harmless, most will be treated. This means that you may get treatment--including surgery, radiation, or chemotherapy--that you don't need. There is a risk of damage to cells or tissue from being exposed to radiation, including the small amounts used in CTs, X-rays, and other medical tests. Over time, exposure to radiation may cause cancer and other health problems. But in most cases, the risk of getting cancer from being exposed to small amounts of radiation is low. It's not a reason to avoid these tests for most people. What are the benefits of screening? Your scan may be normal (negative). For some people who are at higher risk, screening lowers the chance of dying of lung cancer. How much and how long you smoked helps to determine your risk level. Screening can find some cancers early, when treatment may be more likely to work. What happens after screening? The results of your CT scan will be sent to your doctor. Someone from your care team will explain the results of your scan and answer any questions you may have.  If you need any

## 2023-03-30 ENCOUNTER — HOSPITAL ENCOUNTER (OUTPATIENT)
Dept: CT IMAGING | Age: 73
Discharge: HOME | End: 2023-03-30
Payer: COMMERCIAL

## 2023-03-30 DIAGNOSIS — Z87.891 PERSONAL HISTORY OF TOBACCO USE: ICD-10-CM

## 2023-03-30 PROCEDURE — 71271 CT THORAX LUNG CANCER SCR C-: CPT

## 2023-05-10 DIAGNOSIS — I10 ESSENTIAL HYPERTENSION: ICD-10-CM

## 2023-05-10 RX ORDER — METOPROLOL SUCCINATE 50 MG/1
TABLET, EXTENDED RELEASE ORAL
Qty: 90 TABLET | Refills: 1 | Status: SHIPPED | OUTPATIENT
Start: 2023-05-10

## 2023-05-13 DIAGNOSIS — I10 ESSENTIAL HYPERTENSION: ICD-10-CM

## 2023-05-15 RX ORDER — LISINOPRIL 10 MG/1
TABLET ORAL
Qty: 90 TABLET | Refills: 1 | Status: SHIPPED | OUTPATIENT
Start: 2023-05-15

## 2023-08-23 ENCOUNTER — OFFICE VISIT (OUTPATIENT)
Dept: FAMILY MEDICINE CLINIC | Age: 73
End: 2023-08-23
Payer: COMMERCIAL

## 2023-08-23 VITALS
SYSTOLIC BLOOD PRESSURE: 124 MMHG | DIASTOLIC BLOOD PRESSURE: 68 MMHG | HEART RATE: 86 BPM | WEIGHT: 148 LBS | BODY MASS INDEX: 21.54 KG/M2 | OXYGEN SATURATION: 96 % | RESPIRATION RATE: 18 BRPM

## 2023-08-23 DIAGNOSIS — I10 ESSENTIAL HYPERTENSION: Primary | ICD-10-CM

## 2023-08-23 DIAGNOSIS — Z72.0 TOBACCO ABUSE: ICD-10-CM

## 2023-08-23 DIAGNOSIS — K21.9 GASTROESOPHAGEAL REFLUX DISEASE WITHOUT ESOPHAGITIS: ICD-10-CM

## 2023-08-23 DIAGNOSIS — J43.9 PULMONARY EMPHYSEMA, UNSPECIFIED EMPHYSEMA TYPE (HCC): ICD-10-CM

## 2023-08-23 DIAGNOSIS — K22.70 BARRETT'S ESOPHAGUS WITHOUT DYSPLASIA: ICD-10-CM

## 2023-08-23 DIAGNOSIS — E78.2 MIXED HYPERLIPIDEMIA: ICD-10-CM

## 2023-08-23 PROCEDURE — 3074F SYST BP LT 130 MM HG: CPT | Performed by: NURSE PRACTITIONER

## 2023-08-23 PROCEDURE — 1123F ACP DISCUSS/DSCN MKR DOCD: CPT | Performed by: NURSE PRACTITIONER

## 2023-08-23 PROCEDURE — 99214 OFFICE O/P EST MOD 30 MIN: CPT | Performed by: NURSE PRACTITIONER

## 2023-08-23 PROCEDURE — 3078F DIAST BP <80 MM HG: CPT | Performed by: NURSE PRACTITIONER

## 2023-08-23 RX ORDER — ATORVASTATIN CALCIUM 80 MG/1
TABLET, FILM COATED ORAL
Qty: 90 TABLET | Refills: 1 | Status: SHIPPED | OUTPATIENT
Start: 2023-08-23

## 2023-08-23 RX ORDER — PANTOPRAZOLE SODIUM 40 MG/1
TABLET, DELAYED RELEASE ORAL
Qty: 90 TABLET | Refills: 1 | Status: SHIPPED | OUTPATIENT
Start: 2023-08-23

## 2023-08-23 RX ORDER — LISINOPRIL 10 MG/1
10 TABLET ORAL DAILY
Qty: 90 TABLET | Refills: 1 | Status: SHIPPED | OUTPATIENT
Start: 2023-08-23

## 2023-08-23 RX ORDER — METOPROLOL SUCCINATE 50 MG/1
50 TABLET, EXTENDED RELEASE ORAL DAILY
Qty: 90 TABLET | Refills: 1 | Status: SHIPPED | OUTPATIENT
Start: 2023-08-23

## 2023-08-23 RX ORDER — NICOTINE 21 MG/24HR
1 PATCH, TRANSDERMAL 24 HOURS TRANSDERMAL EVERY 24 HOURS
Qty: 28 PATCH | Refills: 5 | Status: SHIPPED | OUTPATIENT
Start: 2023-08-23

## 2023-08-23 ASSESSMENT — PATIENT HEALTH QUESTIONNAIRE - PHQ9
SUM OF ALL RESPONSES TO PHQ QUESTIONS 1-9: 0
SUM OF ALL RESPONSES TO PHQ QUESTIONS 1-9: 0
2. FEELING DOWN, DEPRESSED OR HOPELESS: 0
SUM OF ALL RESPONSES TO PHQ QUESTIONS 1-9: 0
1. LITTLE INTEREST OR PLEASURE IN DOING THINGS: 0
SUM OF ALL RESPONSES TO PHQ QUESTIONS 1-9: 0
SUM OF ALL RESPONSES TO PHQ9 QUESTIONS 1 & 2: 0

## 2023-08-23 ASSESSMENT — ENCOUNTER SYMPTOMS
CHEST TIGHTNESS: 0
ABDOMINAL PAIN: 0
COUGH: 0
VOMITING: 0
SHORTNESS OF BREATH: 0
DIARRHEA: 0
CONSTIPATION: 0
NAUSEA: 0
WHEEZING: 0

## 2023-08-23 NOTE — PATIENT INSTRUCTIONS
We are committed to providing you the best care possible. If you receive a survey after visiting one of our offices, please take time to share your experience concerning your physician office visit. These surveys are confidential and no health information about you is shared. We are eager to improve for you and continue to give you satisfactory care, we are counting on your feedback to help make that happen. [facility]  [id number] [address] [facility] [address] [place] [address], [address] [place] [address]  [phone]        Welcome to [address] [place] [address] and Pediatrics:    Did you know we now have a faster way for you to move through your appointment? For your convenience, we now have digital registration available. When you schedule your next appointment, you will receive a link via your email as well as a text message that will allow you to complete any paperwork digitally before your appointment.

## 2024-03-25 ENCOUNTER — TRANSCRIBE ORDERS (OUTPATIENT)
Dept: ADMINISTRATIVE | Age: 74
End: 2024-03-25

## 2024-03-25 DIAGNOSIS — Z12.2 ENCOUNTER FOR SCREENING FOR MALIGNANT NEOPLASM OF RESPIRATORY ORGANS: Primary | ICD-10-CM

## 2024-03-25 DIAGNOSIS — F17.210 CIGARETTE SMOKER: ICD-10-CM

## 2024-05-08 ENCOUNTER — HOSPITAL ENCOUNTER (OUTPATIENT)
Dept: CT IMAGING | Age: 74
Discharge: HOME | End: 2024-05-08
Payer: COMMERCIAL

## 2024-05-08 DIAGNOSIS — F17.210 CIGARETTE SMOKER: ICD-10-CM

## 2024-05-08 DIAGNOSIS — Z12.2 ENCOUNTER FOR SCREENING FOR MALIGNANT NEOPLASM OF RESPIRATORY ORGANS: ICD-10-CM

## 2024-05-08 PROCEDURE — 71271 CT THORAX LUNG CANCER SCR C-: CPT

## 2024-12-23 ENCOUNTER — APPOINTMENT (OUTPATIENT)
Dept: GENERAL RADIOLOGY | Age: 74
End: 2024-12-23
Payer: COMMERCIAL

## 2024-12-23 ENCOUNTER — HOSPITAL ENCOUNTER (EMERGENCY)
Age: 74
Discharge: HOME | End: 2024-12-23
Attending: EMERGENCY MEDICINE
Payer: COMMERCIAL

## 2024-12-23 VITALS
WEIGHT: 145 LBS | OXYGEN SATURATION: 94 % | DIASTOLIC BLOOD PRESSURE: 88 MMHG | HEART RATE: 111 BPM | SYSTOLIC BLOOD PRESSURE: 151 MMHG | BODY MASS INDEX: 20.76 KG/M2 | HEIGHT: 70 IN | TEMPERATURE: 97.8 F | RESPIRATION RATE: 18 BRPM

## 2024-12-23 DIAGNOSIS — J43.2 CENTRILOBULAR EMPHYSEMA (HCC): Primary | ICD-10-CM

## 2024-12-23 PROCEDURE — 94640 AIRWAY INHALATION TREATMENT: CPT

## 2024-12-23 PROCEDURE — 99283 EMERGENCY DEPT VISIT LOW MDM: CPT

## 2024-12-23 PROCEDURE — 71046 X-RAY EXAM CHEST 2 VIEWS: CPT

## 2024-12-23 PROCEDURE — 6370000000 HC RX 637 (ALT 250 FOR IP): Performed by: EMERGENCY MEDICINE

## 2024-12-23 RX ORDER — DOXYCYCLINE HYCLATE 100 MG
100 TABLET ORAL 2 TIMES DAILY
Qty: 14 TABLET | Refills: 0 | Status: SHIPPED | OUTPATIENT
Start: 2024-12-23 | End: 2024-12-30

## 2024-12-23 RX ORDER — BUDESONIDE AND FORMOTEROL FUMARATE DIHYDRATE 160; 4.5 UG/1; UG/1
2 AEROSOL RESPIRATORY (INHALATION) 2 TIMES DAILY
Qty: 10.2 G | Refills: 3 | Status: SHIPPED | OUTPATIENT
Start: 2024-12-23

## 2024-12-23 RX ORDER — ALBUTEROL SULFATE 90 UG/1
2 INHALANT RESPIRATORY (INHALATION) 4 TIMES DAILY PRN
Qty: 18 G | Refills: 0 | Status: SHIPPED | OUTPATIENT
Start: 2024-12-23

## 2024-12-23 RX ORDER — IPRATROPIUM BROMIDE AND ALBUTEROL SULFATE 2.5; .5 MG/3ML; MG/3ML
1 SOLUTION RESPIRATORY (INHALATION) ONCE
Status: COMPLETED | OUTPATIENT
Start: 2024-12-23 | End: 2024-12-23

## 2024-12-23 RX ORDER — TIOTROPIUM BROMIDE 18 UG/1
18 CAPSULE ORAL; RESPIRATORY (INHALATION) DAILY
Qty: 90 CAPSULE | Refills: 1 | Status: SHIPPED | OUTPATIENT
Start: 2024-12-23

## 2024-12-23 RX ADMIN — IPRATROPIUM BROMIDE AND ALBUTEROL SULFATE 1 DOSE: 2.5; .5 SOLUTION RESPIRATORY (INHALATION) at 09:51

## 2024-12-23 ASSESSMENT — PAIN DESCRIPTION - LOCATION: LOCATION: CHEST

## 2024-12-23 ASSESSMENT — PAIN DESCRIPTION - DESCRIPTORS: DESCRIPTORS: SHARP

## 2024-12-23 ASSESSMENT — PAIN DESCRIPTION - FREQUENCY: FREQUENCY: CONTINUOUS

## 2024-12-23 ASSESSMENT — LIFESTYLE VARIABLES
HOW OFTEN DO YOU HAVE A DRINK CONTAINING ALCOHOL: 4 OR MORE TIMES A WEEK
HOW OFTEN DO YOU HAVE A DRINK CONTAINING ALCOHOL: NEVER
HOW MANY STANDARD DRINKS CONTAINING ALCOHOL DO YOU HAVE ON A TYPICAL DAY: 3 OR 4

## 2024-12-23 ASSESSMENT — ENCOUNTER SYMPTOMS
SHORTNESS OF BREATH: 0
COUGH: 1

## 2024-12-23 ASSESSMENT — PAIN - FUNCTIONAL ASSESSMENT: PAIN_FUNCTIONAL_ASSESSMENT: 0-10

## 2024-12-23 ASSESSMENT — PAIN SCALES - GENERAL: PAINLEVEL_OUTOF10: 6

## 2024-12-23 ASSESSMENT — PAIN DESCRIPTION - ORIENTATION: ORIENTATION: LEFT;LOWER

## 2024-12-23 NOTE — ED PROVIDER NOTES
Triage Chief Complaint:   Cough (wea) and Fatigue (Pt arrives ambulatory with wife who states pt has had upper respiratory sx for one week Pt states he was able to work all week but by Friday began cough productive of yellow sputum and off and on fevers. Pt states last 24 hours has not been able to sleep and feeling weak.  Pt wife a nurse who thinks maybe pt has pneumonia)    [place]:  Sukumar Hernandez is a 74 y.o. male that presents to the ED with a cough.  Patient is a 74-year-old male who has pulmonary emphysema diagnosed by a pulmonologist back on December 6, 2021.  I reviewed his pulmonary function test that was not in epic it is documented that he has an FEV1 of 67% postbronchodilator change 70% has moderate obstructive disease minimal restrictive disease had a CT lungs cancer screening for severe emphysema he was started on Symbicort but I question if he is on any meds he denies this.  He denies any pleuritic pain.  Past history of a PE per the notes the patient did not tell me at this no chest pain        Past Medical History:   Diagnosis Date    Cobian esophagus     Chronic rhinitis     COPD (chronic obstructive pulmonary disease) (HCC)     Cough     GERD (gastroesophageal reflux disease)     Hx of blood clots     Hyperlipidemia     Hypertension     Hypertrophy of prostate     Kidney stones     Pleuritic chest pain     Pulmonary emboli (HCC)     Tachycardia      Past Surgical History:   Procedure Laterality Date    COLONOSCOPY  08/19/2016    10 small colon polyps, int hem    COLONOSCOPY  09/06/2017    hems grade 2    COLONOSCOPY  01/18/2021    Total colonoscopy with polypectomies by Dr. Myers in [place]    COLONOSCOPY N/A 1/18/2021    COLONOSCOPY POLYPECTOMY SNARE/COLD BIOPSY performed by aBljit Myers MD at [facility]    ENDOSCOPY, COLON, DIAGNOSTIC  01/18/2021    Esophagogastroduodenoscopy with biopsies by Dr. Myers in [place]    FINGER SURGERY      LITHOTRIPSY      UPPER GASTROINTESTINAL ENDOSCOPY

## 2025-01-13 ENCOUNTER — TELEPHONE (OUTPATIENT)
Dept: SURGERY | Age: 75
End: 2025-01-13

## 2025-02-11 ENCOUNTER — OFFICE VISIT (OUTPATIENT)
Dept: SURGERY | Age: 75
End: 2025-02-11
Payer: COMMERCIAL

## 2025-02-11 VITALS — WEIGHT: 145 LBS | HEART RATE: 68 BPM | BODY MASS INDEX: 20.76 KG/M2 | OXYGEN SATURATION: 98 % | HEIGHT: 70 IN

## 2025-02-11 DIAGNOSIS — K22.70 BARRETT'S ESOPHAGUS WITHOUT DYSPLASIA: Primary | ICD-10-CM

## 2025-02-11 DIAGNOSIS — Z86.0100 HISTORY OF COLON POLYPS: ICD-10-CM

## 2025-02-11 PROCEDURE — 1123F ACP DISCUSS/DSCN MKR DOCD: CPT | Performed by: SURGERY

## 2025-02-11 PROCEDURE — 99213 OFFICE O/P EST LOW 20 MIN: CPT | Performed by: SURGERY

## 2025-02-11 ASSESSMENT — PATIENT HEALTH QUESTIONNAIRE - PHQ9
SUM OF ALL RESPONSES TO PHQ QUESTIONS 1-9: 0
2. FEELING DOWN, DEPRESSED OR HOPELESS: NOT AT ALL
SUM OF ALL RESPONSES TO PHQ QUESTIONS 1-9: 0
1. LITTLE INTEREST OR PLEASURE IN DOING THINGS: NOT AT ALL
SUM OF ALL RESPONSES TO PHQ9 QUESTIONS 1 & 2: 0

## 2025-02-11 NOTE — PROGRESS NOTES
Chief Complaint   Patient presents with    New Patient     NP- Barrettes Esophagus  and possible colonoscopy REF: Diann Craven         SUBJECTIVE:  HPI: Patient is known to me from prior EGD and colonoscopy in January 2021.  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  Recommendation at that time based on pathology was 3 to 5-year repeat EGD and 5-year repeat colonoscopy.  Sukumar denies any symptoms.  He reports \"I should not feel this good\".  He does continue to smoke tobacco and drink Budweiser.  He denies any reflux symptoms.  No dysphagia.  He reports he eats whatever he wants and never gets heartburn.  He is taking his pantoprazole regularly.  Denies other complaints    I have reviewed the patient's(pertinent information to this visit) medical history, family history(scanned in  the [employer] under \"patient questioner\"), social history and review of systems with the patient today in the office.            Past Surgical History:   Procedure Laterality Date    COLONOSCOPY  08/19/2016    10 small colon polyps, int hem    COLONOSCOPY  09/06/2017    hems grade 2    COLONOSCOPY  01/18/2021    Total colonoscopy with polypectomies by Dr. Myers in [place]    COLONOSCOPY N/A 1/18/2021    COLONOSCOPY POLYPECTOMY SNARE/COLD BIOPSY [place] [facility]    ENDOSCOPY, COLON, DIAGNOSTIC  01/18/2021    Esophagogastroduodenoscopy with biopsies by Dr. Myers in [place]    FINGER SURGERY      LITHOTRIPSY      UPPER GASTROINTESTINAL ENDOSCOPY N/A 1/18/2021    EGD BIOPSY performed by Baljit Myers MD at [facility]    VASECTOMY       Past Medical History:   Diagnosis Date    Cobina esophagus     Chronic rhinitis     COPD (chronic obstructive pulmonary disease) (HCC)     Cough     GERD (gastroesophageal reflux disease)     Hx of blood clots     Hyperlipidemia     Hypertension     Hypertrophy of prostate     Kidney stones     Pleuritic chest pain

## 2025-07-15 ENCOUNTER — TRANSCRIBE ORDERS (OUTPATIENT)
Dept: ADMINISTRATIVE | Age: 75
End: 2025-07-15

## 2025-07-15 DIAGNOSIS — Z12.2 ENCOUNTER FOR SCREENING FOR MALIGNANT NEOPLASM OF RESPIRATORY ORGANS: Primary | ICD-10-CM

## 2025-07-28 ENCOUNTER — HOSPITAL ENCOUNTER (OUTPATIENT)
Dept: CT IMAGING | Age: 75
Discharge: HOME | End: 2025-07-28
Payer: COMMERCIAL

## 2025-07-28 DIAGNOSIS — Z12.2 ENCOUNTER FOR SCREENING FOR MALIGNANT NEOPLASM OF RESPIRATORY ORGANS: ICD-10-CM

## 2025-07-28 PROCEDURE — 71271 CT THORAX LUNG CANCER SCR C-: CPT

## (undated) DEVICE — YANKAUER,FLEXIBLE HANDLE,REGLR CAPACITY: Brand: MEDLINE INDUSTRIES, INC.

## (undated) DEVICE — BITE BLOCK ENDOSCP AD 60 FR W/ ADJ STRP PLAS GRN BLOX

## (undated) DEVICE — ELECTRODE,RADIOTRANSLUCENT,FOAM,5PK: Brand: MEDLINE

## (undated) DEVICE — 4-PORT MANIFOLD: Brand: NEPTUNE 2

## (undated) DEVICE — JELLY,LUBE,STERILE,FLIP TOP,TUBE,2-OZ: Brand: MEDLINE

## (undated) DEVICE — TUBING, SUCTION, 1/4" X 10', STRAIGHT: Brand: MEDLINE

## (undated) DEVICE — LINE SAMP O2 6.5FT W/FEMALE CONN F/ADULT CAPNOLINE PLUS

## (undated) DEVICE — SOLUTION IV IRRIG WATER 1000ML POUR BRL 2F7114

## (undated) DEVICE — FORCEPS BX L240CM JAW DIA2.8MM L CAP W/ NDL MIC MESH TOOTH

## (undated) DEVICE — SINGLE-USE POLYPECTOMY SNARE: Brand: CAPTIVATOR

## (undated) DEVICE — DEFENDO AIR WATER SUCTION AND BIOPSY VALVE KIT FOR  OLYMPUS: Brand: DEFENDO AIR/WATER/SUCTION AND BIOPSY VALVE

## (undated) DEVICE — THE TORRENT IRRIGATION SCOPE CONNECTOR IS USED WITH THE TORRENT IRRIGATION TUBING TO PROVIDE IRRIGATION FLUIDS SUCH AS STERILE WATER DURING GASTROINTESTINAL ENDOSCOPIC PROCEDURES WHEN USED IN CONJUNCTION WITH AN IRRIGATION PUMP (OR ELECTROSURGICAL UNIT).: Brand: TORRENT

## (undated) DEVICE — THE TORRENT IRRIGATION TUBING IS INTENDED TO PROVIDE IRRIGATION VIA IRRIGATION FLUIDS, SUCH AS STERILE WATER, DURING GASTROINTESTINAL ENDOSCOPIC PROCEDURES WHEN USED IN CONJUNCTION WITH AN IRRIGATION PUMP OR ELECTROSURGICAL UNIT.: Brand: TORRENT

## (undated) DEVICE — LINER SUCT CANSTR 1500CC SEMI RIG W/ POR HYDROPHOBIC SHUT